# Patient Record
Sex: MALE | Race: WHITE | NOT HISPANIC OR LATINO | URBAN - METROPOLITAN AREA
[De-identification: names, ages, dates, MRNs, and addresses within clinical notes are randomized per-mention and may not be internally consistent; named-entity substitution may affect disease eponyms.]

---

## 2017-02-03 ENCOUNTER — EMERGENCY (EMERGENCY)
Facility: HOSPITAL | Age: 82
LOS: 1 days | Discharge: ANOTHER HOSPITAL | End: 2017-02-03
Attending: EMERGENCY MEDICINE | Admitting: EMERGENCY MEDICINE
Payer: MEDICARE

## 2017-02-03 ENCOUNTER — TRANSCRIPTION ENCOUNTER (OUTPATIENT)
Age: 82
End: 2017-02-03

## 2017-02-03 ENCOUNTER — INPATIENT (INPATIENT)
Facility: HOSPITAL | Age: 82
LOS: 4 days | Discharge: HOME | DRG: 552 | End: 2017-02-08
Attending: INTERNAL MEDICINE | Admitting: INTERNAL MEDICINE
Payer: MEDICARE

## 2017-02-03 VITALS
OXYGEN SATURATION: 99 % | DIASTOLIC BLOOD PRESSURE: 78 MMHG | TEMPERATURE: 99 F | RESPIRATION RATE: 16 BRPM | SYSTOLIC BLOOD PRESSURE: 147 MMHG | HEART RATE: 84 BPM

## 2017-02-03 VITALS
HEART RATE: 80 BPM | SYSTOLIC BLOOD PRESSURE: 148 MMHG | DIASTOLIC BLOOD PRESSURE: 78 MMHG | RESPIRATION RATE: 18 BRPM | OXYGEN SATURATION: 97 %

## 2017-02-03 VITALS
WEIGHT: 115.96 LBS | DIASTOLIC BLOOD PRESSURE: 71 MMHG | SYSTOLIC BLOOD PRESSURE: 174 MMHG | HEART RATE: 70 BPM | TEMPERATURE: 98 F | OXYGEN SATURATION: 97 % | HEIGHT: 66 IN | RESPIRATION RATE: 16 BRPM

## 2017-02-03 DIAGNOSIS — Z95.1 PRESENCE OF AORTOCORONARY BYPASS GRAFT: Chronic | ICD-10-CM

## 2017-02-03 DIAGNOSIS — Z87.01 PERSONAL HISTORY OF PNEUMONIA (RECURRENT): ICD-10-CM
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
## 2017-02-03 DIAGNOSIS — I25.10 ATHEROSCLEROTIC HEART DISEASE OF NATIVE CORONARY ARTERY WITHOUT ANGINA PECTORIS: ICD-10-CM

## 2017-02-03 DIAGNOSIS — I25.2 OLD MYOCARDIAL INFARCTION: ICD-10-CM
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
## 2017-02-03 DIAGNOSIS — S12.9XXA FRACTURE OF NECK, UNSPECIFIED, INITIAL ENCOUNTER: ICD-10-CM

## 2017-02-03 DIAGNOSIS — S12.100A UNSPECIFIED DISPLACED FRACTURE OF SECOND CERVICAL VERTEBRA, INITIAL ENCOUNTER FOR CLOSED FRACTURE: ICD-10-CM

## 2017-02-03 DIAGNOSIS — Z95.1 PRESENCE OF AORTOCORONARY BYPASS GRAFT: ICD-10-CM

## 2017-02-03 DIAGNOSIS — M54.2 CERVICALGIA: ICD-10-CM

## 2017-02-03 DIAGNOSIS — F03.90 UNSPECIFIED DEMENTIA, UNSPECIFIED SEVERITY, WITHOUT BEHAVIORAL DISTURBANCE, PSYCHOTIC DISTURBANCE, MOOD DISTURBANCE, AND ANXIETY: ICD-10-CM

## 2017-02-03 PROBLEM — Z00.00 ENCOUNTER FOR PREVENTIVE HEALTH EXAMINATION: Status: ACTIVE | Noted: 2017-02-03

## 2017-02-03 LAB
ALBUMIN SERPL ELPH-MCNC: 3.4 G/DL — SIGNIFICANT CHANGE UP (ref 3.3–5)
ALP SERPL-CCNC: 112 U/L — SIGNIFICANT CHANGE UP (ref 40–120)
ALT FLD-CCNC: 17 U/L RC — SIGNIFICANT CHANGE UP (ref 10–45)
ANION GAP SERPL CALC-SCNC: 13 MMOL/L — SIGNIFICANT CHANGE UP (ref 5–17)
APTT BLD: 48.1 SEC — HIGH (ref 27.5–37.4)
AST SERPL-CCNC: 21 U/L — SIGNIFICANT CHANGE UP (ref 10–40)
BASOPHILS # BLD AUTO: 0 K/UL — SIGNIFICANT CHANGE UP (ref 0–0.2)
BASOPHILS NFR BLD AUTO: 0.2 % — SIGNIFICANT CHANGE UP (ref 0–2)
BILIRUB SERPL-MCNC: 0.6 MG/DL — SIGNIFICANT CHANGE UP (ref 0.2–1.2)
BLD GP AB SCN SERPL QL: NEGATIVE — SIGNIFICANT CHANGE UP
BUN SERPL-MCNC: 24 MG/DL — HIGH (ref 7–23)
CALCIUM SERPL-MCNC: 8.7 MG/DL — SIGNIFICANT CHANGE UP (ref 8.4–10.5)
CHLORIDE SERPL-SCNC: 105 MMOL/L — SIGNIFICANT CHANGE UP (ref 96–108)
CO2 SERPL-SCNC: 25 MMOL/L — SIGNIFICANT CHANGE UP (ref 22–31)
CREAT SERPL-MCNC: 0.66 MG/DL — SIGNIFICANT CHANGE UP (ref 0.5–1.3)
EOSINOPHIL # BLD AUTO: 0.1 K/UL — SIGNIFICANT CHANGE UP (ref 0–0.5)
EOSINOPHIL NFR BLD AUTO: 1.1 % — SIGNIFICANT CHANGE UP (ref 0–6)
GLUCOSE SERPL-MCNC: 101 MG/DL — HIGH (ref 70–99)
HCT VFR BLD CALC: 34.3 % — LOW (ref 39–50)
HGB BLD-MCNC: 11.2 G/DL — LOW (ref 13–17)
INR BLD: 1.1 RATIO — SIGNIFICANT CHANGE UP (ref 0.88–1.16)
LYMPHOCYTES # BLD AUTO: 1.5 K/UL — SIGNIFICANT CHANGE UP (ref 1–3.3)
LYMPHOCYTES # BLD AUTO: 14.3 % — SIGNIFICANT CHANGE UP (ref 13–44)
MCHC RBC-ENTMCNC: 26.6 PG — LOW (ref 27–34)
MCHC RBC-ENTMCNC: 32.6 GM/DL — SIGNIFICANT CHANGE UP (ref 32–36)
MCV RBC AUTO: 81.8 FL — SIGNIFICANT CHANGE UP (ref 80–100)
MONOCYTES # BLD AUTO: 1 K/UL — HIGH (ref 0–0.9)
MONOCYTES NFR BLD AUTO: 9.4 % — SIGNIFICANT CHANGE UP (ref 2–14)
NEUTROPHILS # BLD AUTO: 8 K/UL — HIGH (ref 1.8–7.4)
NEUTROPHILS NFR BLD AUTO: 74.9 % — SIGNIFICANT CHANGE UP (ref 43–77)
PLATELET # BLD AUTO: 225 K/UL — SIGNIFICANT CHANGE UP (ref 150–400)
POTASSIUM SERPL-MCNC: 4.2 MMOL/L — SIGNIFICANT CHANGE UP (ref 3.5–5.3)
POTASSIUM SERPL-SCNC: 4.2 MMOL/L — SIGNIFICANT CHANGE UP (ref 3.5–5.3)
PROT SERPL-MCNC: 6.2 G/DL — SIGNIFICANT CHANGE UP (ref 6–8.3)
PROTHROM AB SERPL-ACNC: 11.9 SEC — SIGNIFICANT CHANGE UP (ref 10–13.1)
RBC # BLD: 4.2 M/UL — SIGNIFICANT CHANGE UP (ref 4.2–5.8)
RBC # FLD: 15 % — HIGH (ref 10.3–14.5)
RH IG SCN BLD-IMP: POSITIVE — SIGNIFICANT CHANGE UP
RH IG SCN BLD-IMP: POSITIVE — SIGNIFICANT CHANGE UP
SODIUM SERPL-SCNC: 143 MMOL/L — SIGNIFICANT CHANGE UP (ref 135–145)
WBC # BLD: 10.7 K/UL — HIGH (ref 3.8–10.5)
WBC # FLD AUTO: 10.7 K/UL — HIGH (ref 3.8–10.5)

## 2017-02-03 PROCEDURE — 99285 EMERGENCY DEPT VISIT HI MDM: CPT | Mod: 25,GC

## 2017-02-03 PROCEDURE — 99223 1ST HOSP IP/OBS HIGH 75: CPT

## 2017-02-03 PROCEDURE — 72125 CT NECK SPINE W/O DYE: CPT | Mod: 26

## 2017-02-03 PROCEDURE — 70450 CT HEAD/BRAIN W/O DYE: CPT | Mod: 26

## 2017-02-03 PROCEDURE — 93010 ELECTROCARDIOGRAM REPORT: CPT

## 2017-02-03 PROCEDURE — 99285 EMERGENCY DEPT VISIT HI MDM: CPT

## 2017-02-03 PROCEDURE — 70450 CT HEAD/BRAIN W/O DYE: CPT

## 2017-02-03 PROCEDURE — 72125 CT NECK SPINE W/O DYE: CPT

## 2017-02-03 PROCEDURE — 99285 EMERGENCY DEPT VISIT HI MDM: CPT | Mod: 25

## 2017-02-03 RX ORDER — TRAMADOL HYDROCHLORIDE 50 MG/1
50 TABLET ORAL ONCE
Qty: 0 | Refills: 0 | Status: DISCONTINUED | OUTPATIENT
Start: 2017-02-03 | End: 2017-02-03

## 2017-02-03 RX ORDER — SENNA PLUS 8.6 MG/1
2 TABLET ORAL AT BEDTIME
Qty: 0 | Refills: 0 | Status: DISCONTINUED | OUTPATIENT
Start: 2017-02-03 | End: 2017-02-08

## 2017-02-03 RX ORDER — HEPARIN SODIUM 5000 [USP'U]/ML
5000 INJECTION INTRAVENOUS; SUBCUTANEOUS EVERY 8 HOURS
Qty: 0 | Refills: 0 | Status: DISCONTINUED | OUTPATIENT
Start: 2017-02-03 | End: 2017-02-08

## 2017-02-03 NOTE — ED ADULT NURSE NOTE - OBJECTIVE STATEMENT
patient received via ambulance transferred from [facility] s/p fall from nursing home dx cervical fx. As per patient he was on a chair this am & accidentally fell off. Staff noted increasing lethargy & sent to clinic then sent to [place]. CT revealed a C2 fx. Received with a phila c collar changed to [place] collar on arrival. C spine precaution maintained. Alert & oriented to self,person & place but not to time & year. awaiting for spine consult.

## 2017-02-03 NOTE — ED PROVIDER NOTE - CARE PLAN
Principal Discharge DX:	Fall, initial encounter  Secondary Diagnosis:	Closed odontoid fracture with type II morphology and posterior displacement, initial encounter

## 2017-02-03 NOTE — H&P ADULT. - EKG AND INTERPRETATION
Personally reviewed: EKG 71 bpm  QTc 430 incomplete RBBB TW in V3 Personally reviewed: EKG 71 bpm  QTc 467 incomplete RBBB TWI inv in V3

## 2017-02-03 NOTE — ED PROVIDER NOTE - CONSTITUTIONAL, MLM
normal... Well appearing, well nourished, awake, alert, oriented to person, and in no apparent distress.

## 2017-02-03 NOTE — ED PROVIDER NOTE - OBJECTIVE STATEMENT
88M history of CAD (sp CABG), dementia (A&Ox2) presents from [facility] where he was evaluated after a mechanical fall from chair this morning found to have C2 type 2 fracture with neuro intact. C/o mild head and neck pain, but no other complaints of chest pain, abdominal pain, vision changes, paresthesias. 88M history of CAD (sp CABG), at base line MS gavino dementia (A&Ox2) presents from [facility] where he was evaluated after a mechanical fall from chair this morning found to have C2 type 2 fracture with neuro intact. C/o mild head and neck pain, but no other complaints of chest pain, abdominal pain, vision changes, paresthesias.

## 2017-02-03 NOTE — ED ADULT NURSE NOTE - OBJECTIVE STATEMENT
Pt alert and oriented x2, family member reports patient "dozed" while sitting in chair, fell and hit head on ground, X-rays show C-2 fx, currently denies pain unless he moves, refused pain medication, prepared for transfer to [facility] ED, accompanied by family member.

## 2017-02-03 NOTE — ED PROVIDER NOTE - PROGRESS NOTE DETAILS
KUMARI: Alert awake, No T/S/L spine TTP, + Mild c spine at C2 level ttp. Nonfoc KENYETTA: Nonfocal, admit, reasses

## 2017-02-03 NOTE — H&P ADULT. - HISTORY OF PRESENT ILLNESS
87 yo man with PMH of MI/CAD s/p CABG ~8 years ago, dementia (~ 1 year) presents after a mechanical fall complaining of neck pain. Patient was sitting in kitchen chair and fell sideways and onto the sewing machine table near by and then fell forward. And saw red edson on forehead and took a shower thereafter. He was complaining and hurting after a while and took him to urgent. No increase confusion and remained at baseline. Dose have home health aide 8 hrs a day for the week. Denies fevers, chills, sweats, CP, SOB, palpitations. Denies abdominal pain, n/v/d. Possible dysuria. Typically with good appetite.       Donepezil 10 mg qAM  MVI  Iron pill 89 yo man with PMH of MI/CAD s/p CABG ~8 years ago, dementia (~ 1 year) presents after a mechanical fall complaining of neck pain. Neck pain initially described as 7/10 (currently patient states improved) without radiation. No appreciable new numbness, nor weakness. This morning, patient was sitting in kitchen chair and fell sideways and onto the sewing machine table near by and then fell forward. Fall witness by Aide. And saw red edson on forehead and took a shower thereafter. He was complaining and hurting after a while and took him to urgent care center and thereafter to OSH ED.  No increase confusion and remained at baseline. Dose have home health aide 8 hrs a day for the week. Denies fevers, chills, sweats, CP, SOB, palpitations. Denies abdominal pain, n/v/d. Possible dysuria. Typically with good appetite.

## 2017-02-03 NOTE — ED PROVIDER NOTE - MEDICAL DECISION MAKING DETAILS
88M history of CAD (sp CABG), at base line MS w dementia (A&Ox2) presents from [facility] where he was evaluated after a mechanical fall from chair this morning found to have C2 type 2 fracture with neuro intact. C/o mild head and neck pain. C collar in place, spine consult, admit to medicine w spine consult reassess admit

## 2017-02-03 NOTE — ED PROVIDER NOTE - ATTENDING CONTRIBUTION TO CARE
88M history of CAD (sp CABG), at base line MS w dementia (A&Ox2) presents from [facility] where he was evaluated after a mechanical fall from chair this morning found to have C2 type 2 fracture with neuro intact. C/o mild head and neck pain. C collar in place, spine consult, admit to medicine w spine consult reassess admit

## 2017-02-03 NOTE — H&P ADULT. - ASSESSMENT
87 yo man with PMH of MI/CAD s/p CABG ~8 years ago, dementia (~ 1 year) presents after a mechanical fall complaining of neck pain with C2 dens fracture.

## 2017-02-03 NOTE — H&P ADULT. - PROBLEM SELECTOR PLAN 1
Reviewed neurosurgery recs:  Q4H neuro checks  Continue with C-Collar  Type II Dens fracture with posterior displacement   Spine precautions prior to MRI  MRI C-Spine without contrast  PT/OT after MRI

## 2017-02-03 NOTE — ED PROVIDER NOTE - MUSCULOSKELETAL, MLM
Spine appears normal, range of motion is not limited, no muscle or joint tenderness. C-collar in place.

## 2017-02-03 NOTE — H&P ADULT. - RADIOLOGY RESULTS AND INTERPRETATION
CT Head and C-Spine:   BRAIN: No intracranial hemorrhage, mass effect or depressed skull   fracture.  CERVICAL SPINE: Type II dens fracture with mild posterior displacement of   the dens.  No significant bony narrowing of the foramen magnum or   cervical spinal canal.

## 2017-02-03 NOTE — ED PROVIDER NOTE - PROGRESS NOTE DETAILS
transfer center called- for trauma and spine transfer center called- for trauma and spine accepting spine Latefi  er attending Felipe

## 2017-02-03 NOTE — ED ADULT NURSE REASSESSMENT NOTE - NS ED NURSE REASSESS COMMENT FT1
3470- transport relayed information received from MRI that test is scheduled tommorrow instead. [place] transported direct to room assigned.

## 2017-02-03 NOTE — ED PROVIDER NOTE - OBJECTIVE STATEMENT
Data per son-pt is poor historian: PMD dr audrey broussard co "neck pain upon awakening" but with further questioning, per son pt fell off a chair this am and thit the top of his head.  co pain all day so son brought him to er for eval  no weakness numbness but pain in neck and bump on head  pmhx of memory problems, cad sp mi sp cabgx3 pneumonia sp hernia repair sp carpal tunnel repair

## 2017-02-03 NOTE — H&P ADULT. - MOTOR
5/5 Strength of RUE  3/5 Strength of LUE at shoulder (chronic per patient) 4-5/5 Strength of LUE and elbow and hand   5/5 Strength of B/L LE

## 2017-02-04 DIAGNOSIS — F03.90 UNSPECIFIED DEMENTIA, UNSPECIFIED SEVERITY, WITHOUT BEHAVIORAL DISTURBANCE, PSYCHOTIC DISTURBANCE, MOOD DISTURBANCE, AND ANXIETY: ICD-10-CM

## 2017-02-04 DIAGNOSIS — S12.9XXA FRACTURE OF NECK, UNSPECIFIED, INITIAL ENCOUNTER: ICD-10-CM

## 2017-02-04 DIAGNOSIS — I25.10 ATHEROSCLEROTIC HEART DISEASE OF NATIVE CORONARY ARTERY WITHOUT ANGINA PECTORIS: ICD-10-CM

## 2017-02-04 DIAGNOSIS — Z29.9 ENCOUNTER FOR PROPHYLACTIC MEASURES, UNSPECIFIED: ICD-10-CM

## 2017-02-04 LAB
ANION GAP SERPL CALC-SCNC: 13 MMOL/L — SIGNIFICANT CHANGE UP (ref 5–17)
BASOPHILS # BLD AUTO: 0 K/UL — SIGNIFICANT CHANGE UP (ref 0–0.2)
BASOPHILS NFR BLD AUTO: 0.2 % — SIGNIFICANT CHANGE UP (ref 0–2)
BUN SERPL-MCNC: 21 MG/DL — SIGNIFICANT CHANGE UP (ref 7–23)
CALCIUM SERPL-MCNC: 8.8 MG/DL — SIGNIFICANT CHANGE UP (ref 8.4–10.5)
CHLORIDE SERPL-SCNC: 101 MMOL/L — SIGNIFICANT CHANGE UP (ref 96–108)
CK MB BLD-MCNC: 2.6 % — SIGNIFICANT CHANGE UP (ref 0–3.5)
CK MB CFR SERPL CALC: 6.1 NG/ML — SIGNIFICANT CHANGE UP (ref 0–6.7)
CK SERPL-CCNC: 235 U/L — HIGH (ref 30–200)
CO2 SERPL-SCNC: 25 MMOL/L — SIGNIFICANT CHANGE UP (ref 22–31)
CREAT SERPL-MCNC: 0.63 MG/DL — SIGNIFICANT CHANGE UP (ref 0.5–1.3)
EOSINOPHIL # BLD AUTO: 0.2 K/UL — SIGNIFICANT CHANGE UP (ref 0–0.5)
EOSINOPHIL NFR BLD AUTO: 1.9 % — SIGNIFICANT CHANGE UP (ref 0–6)
GLUCOSE SERPL-MCNC: 91 MG/DL — SIGNIFICANT CHANGE UP (ref 70–99)
HCT VFR BLD CALC: 38.9 % — LOW (ref 39–50)
HGB BLD-MCNC: 12.2 G/DL — LOW (ref 13–17)
LYMPHOCYTES # BLD AUTO: 1.6 K/UL — SIGNIFICANT CHANGE UP (ref 1–3.3)
LYMPHOCYTES # BLD AUTO: 15.7 % — SIGNIFICANT CHANGE UP (ref 13–44)
MAGNESIUM SERPL-MCNC: 2.4 MG/DL — SIGNIFICANT CHANGE UP (ref 1.6–2.6)
MCHC RBC-ENTMCNC: 25.8 PG — LOW (ref 27–34)
MCHC RBC-ENTMCNC: 31.4 GM/DL — LOW (ref 32–36)
MCV RBC AUTO: 81.9 FL — SIGNIFICANT CHANGE UP (ref 80–100)
MONOCYTES # BLD AUTO: 1.1 K/UL — HIGH (ref 0–0.9)
MONOCYTES NFR BLD AUTO: 10.6 % — SIGNIFICANT CHANGE UP (ref 2–14)
NEUTROPHILS # BLD AUTO: 7.5 K/UL — HIGH (ref 1.8–7.4)
NEUTROPHILS NFR BLD AUTO: 71.6 % — SIGNIFICANT CHANGE UP (ref 43–77)
PHOSPHATE SERPL-MCNC: 3.1 MG/DL — SIGNIFICANT CHANGE UP (ref 2.5–4.5)
PLATELET # BLD AUTO: 239 K/UL — SIGNIFICANT CHANGE UP (ref 150–400)
POTASSIUM SERPL-MCNC: 4.1 MMOL/L — SIGNIFICANT CHANGE UP (ref 3.5–5.3)
POTASSIUM SERPL-SCNC: 4.1 MMOL/L — SIGNIFICANT CHANGE UP (ref 3.5–5.3)
RBC # BLD: 4.75 M/UL — SIGNIFICANT CHANGE UP (ref 4.2–5.8)
RBC # FLD: 15.1 % — HIGH (ref 10.3–14.5)
SODIUM SERPL-SCNC: 139 MMOL/L — SIGNIFICANT CHANGE UP (ref 135–145)
TROPONIN T SERPL-MCNC: <0.01 NG/ML — SIGNIFICANT CHANGE UP (ref 0–0.06)
WBC # BLD: 10.4 K/UL — SIGNIFICANT CHANGE UP (ref 3.8–10.5)
WBC # FLD AUTO: 10.4 K/UL — SIGNIFICANT CHANGE UP (ref 3.8–10.5)

## 2017-02-04 PROCEDURE — 72141 MRI NECK SPINE W/O DYE: CPT | Mod: 26

## 2017-02-04 PROCEDURE — 99233 SBSQ HOSP IP/OBS HIGH 50: CPT | Mod: GC

## 2017-02-04 RX ORDER — SODIUM CHLORIDE 9 MG/ML
500 INJECTION INTRAMUSCULAR; INTRAVENOUS; SUBCUTANEOUS
Qty: 0 | Refills: 0 | Status: DISCONTINUED | OUTPATIENT
Start: 2017-02-04 | End: 2017-02-04

## 2017-02-04 RX ORDER — INFLUENZA VIRUS VACCINE 15; 15; 15; 15 UG/.5ML; UG/.5ML; UG/.5ML; UG/.5ML
0.5 SUSPENSION INTRAMUSCULAR ONCE
Qty: 0 | Refills: 0 | Status: DISCONTINUED | OUTPATIENT
Start: 2017-02-04 | End: 2017-02-08

## 2017-02-04 RX ORDER — DONEPEZIL HYDROCHLORIDE 10 MG/1
10 TABLET, FILM COATED ORAL DAILY
Qty: 0 | Refills: 0 | Status: DISCONTINUED | OUTPATIENT
Start: 2017-02-04 | End: 2017-02-08

## 2017-02-04 RX ORDER — DONEPEZIL HYDROCHLORIDE 10 MG/1
0 TABLET, FILM COATED ORAL
Qty: 0 | Refills: 0 | COMMUNITY

## 2017-02-04 RX ADMIN — HEPARIN SODIUM 5000 UNIT(S): 5000 INJECTION INTRAVENOUS; SUBCUTANEOUS at 05:46

## 2017-02-04 RX ADMIN — HEPARIN SODIUM 5000 UNIT(S): 5000 INJECTION INTRAVENOUS; SUBCUTANEOUS at 13:07

## 2017-02-04 RX ADMIN — SODIUM CHLORIDE 50 MILLILITER(S): 9 INJECTION INTRAMUSCULAR; INTRAVENOUS; SUBCUTANEOUS at 06:50

## 2017-02-04 RX ADMIN — HEPARIN SODIUM 5000 UNIT(S): 5000 INJECTION INTRAVENOUS; SUBCUTANEOUS at 21:49

## 2017-02-04 RX ADMIN — DONEPEZIL HYDROCHLORIDE 10 MILLIGRAM(S): 10 TABLET, FILM COATED ORAL at 11:55

## 2017-02-05 LAB
ANION GAP SERPL CALC-SCNC: 13 MMOL/L — SIGNIFICANT CHANGE UP (ref 5–17)
BUN SERPL-MCNC: 17 MG/DL — SIGNIFICANT CHANGE UP (ref 7–23)
CALCIUM SERPL-MCNC: 8.9 MG/DL — SIGNIFICANT CHANGE UP (ref 8.4–10.5)
CHLORIDE SERPL-SCNC: 100 MMOL/L — SIGNIFICANT CHANGE UP (ref 96–108)
CK MB BLD-MCNC: 2.3 % — SIGNIFICANT CHANGE UP (ref 0–3.5)
CK MB BLD-MCNC: 2.5 % — SIGNIFICANT CHANGE UP (ref 0–3.5)
CK MB BLD-MCNC: 2.9 % — SIGNIFICANT CHANGE UP (ref 0–3.5)
CK MB BLD-MCNC: 2.9 % — SIGNIFICANT CHANGE UP (ref 0–3.5)
CK MB CFR SERPL CALC: 4 NG/ML — SIGNIFICANT CHANGE UP (ref 0–6.7)
CK MB CFR SERPL CALC: 6 NG/ML — SIGNIFICANT CHANGE UP (ref 0–6.7)
CK MB CFR SERPL CALC: 6.9 NG/ML — HIGH (ref 0–6.7)
CK MB CFR SERPL CALC: 7.2 NG/ML — HIGH (ref 0–6.7)
CK SERPL-CCNC: 137 U/L — SIGNIFICANT CHANGE UP (ref 30–200)
CK SERPL-CCNC: 208 U/L — HIGH (ref 30–200)
CK SERPL-CCNC: 290 U/L — HIGH (ref 30–200)
CK SERPL-CCNC: 302 U/L — HIGH (ref 30–200)
CO2 SERPL-SCNC: 26 MMOL/L — SIGNIFICANT CHANGE UP (ref 22–31)
CREAT SERPL-MCNC: 0.73 MG/DL — SIGNIFICANT CHANGE UP (ref 0.5–1.3)
GLUCOSE SERPL-MCNC: 80 MG/DL — SIGNIFICANT CHANGE UP (ref 70–99)
HCT VFR BLD CALC: 38.6 % — LOW (ref 39–50)
HGB BLD-MCNC: 12.2 G/DL — LOW (ref 13–17)
MAGNESIUM SERPL-MCNC: 2.4 MG/DL — SIGNIFICANT CHANGE UP (ref 1.6–2.6)
MCHC RBC-ENTMCNC: 25.7 PG — LOW (ref 27–34)
MCHC RBC-ENTMCNC: 31.6 GM/DL — LOW (ref 32–36)
MCV RBC AUTO: 81.2 FL — SIGNIFICANT CHANGE UP (ref 80–100)
PHOSPHATE SERPL-MCNC: 3.5 MG/DL — SIGNIFICANT CHANGE UP (ref 2.5–4.5)
PLATELET # BLD AUTO: 250 K/UL — SIGNIFICANT CHANGE UP (ref 150–400)
POTASSIUM SERPL-MCNC: 4 MMOL/L — SIGNIFICANT CHANGE UP (ref 3.5–5.3)
POTASSIUM SERPL-SCNC: 4 MMOL/L — SIGNIFICANT CHANGE UP (ref 3.5–5.3)
RBC # BLD: 4.75 M/UL — SIGNIFICANT CHANGE UP (ref 4.2–5.8)
RBC # FLD: 14.4 % — SIGNIFICANT CHANGE UP (ref 10.3–14.5)
SODIUM SERPL-SCNC: 139 MMOL/L — SIGNIFICANT CHANGE UP (ref 135–145)
TROPONIN T SERPL-MCNC: 0.01 NG/ML — SIGNIFICANT CHANGE UP (ref 0–0.06)
TROPONIN T SERPL-MCNC: <0.01 NG/ML — SIGNIFICANT CHANGE UP (ref 0–0.06)
WBC # BLD: 10.4 K/UL — SIGNIFICANT CHANGE UP (ref 3.8–10.5)
WBC # FLD AUTO: 10.4 K/UL — SIGNIFICANT CHANGE UP (ref 3.8–10.5)

## 2017-02-05 PROCEDURE — 93010 ELECTROCARDIOGRAM REPORT: CPT

## 2017-02-05 PROCEDURE — 99233 SBSQ HOSP IP/OBS HIGH 50: CPT | Mod: GC

## 2017-02-05 RX ORDER — ATORVASTATIN CALCIUM 80 MG/1
40 TABLET, FILM COATED ORAL AT BEDTIME
Qty: 0 | Refills: 0 | Status: DISCONTINUED | OUTPATIENT
Start: 2017-02-05 | End: 2017-02-08

## 2017-02-05 RX ORDER — ASPIRIN/CALCIUM CARB/MAGNESIUM 324 MG
81 TABLET ORAL DAILY
Qty: 0 | Refills: 0 | Status: DISCONTINUED | OUTPATIENT
Start: 2017-02-05 | End: 2017-02-08

## 2017-02-05 RX ADMIN — Medication 81 MILLIGRAM(S): at 12:19

## 2017-02-05 RX ADMIN — HEPARIN SODIUM 5000 UNIT(S): 5000 INJECTION INTRAVENOUS; SUBCUTANEOUS at 05:00

## 2017-02-05 RX ADMIN — HEPARIN SODIUM 5000 UNIT(S): 5000 INJECTION INTRAVENOUS; SUBCUTANEOUS at 14:36

## 2017-02-05 RX ADMIN — HEPARIN SODIUM 5000 UNIT(S): 5000 INJECTION INTRAVENOUS; SUBCUTANEOUS at 21:34

## 2017-02-05 RX ADMIN — ATORVASTATIN CALCIUM 40 MILLIGRAM(S): 80 TABLET, FILM COATED ORAL at 21:34

## 2017-02-05 RX ADMIN — DONEPEZIL HYDROCHLORIDE 10 MILLIGRAM(S): 10 TABLET, FILM COATED ORAL at 12:19

## 2017-02-06 LAB
ANION GAP SERPL CALC-SCNC: 12 MMOL/L — SIGNIFICANT CHANGE UP (ref 5–17)
BUN SERPL-MCNC: 28 MG/DL — HIGH (ref 7–23)
CALCIUM SERPL-MCNC: 8.9 MG/DL — SIGNIFICANT CHANGE UP (ref 8.4–10.5)
CHLORIDE SERPL-SCNC: 100 MMOL/L — SIGNIFICANT CHANGE UP (ref 96–108)
CK MB BLD-MCNC: 3.5 % — SIGNIFICANT CHANGE UP (ref 0–3.5)
CK MB CFR SERPL CALC: 3.6 NG/ML — SIGNIFICANT CHANGE UP (ref 0–6.7)
CK SERPL-CCNC: 102 U/L — SIGNIFICANT CHANGE UP (ref 30–200)
CO2 SERPL-SCNC: 27 MMOL/L — SIGNIFICANT CHANGE UP (ref 22–31)
CREAT SERPL-MCNC: 0.8 MG/DL — SIGNIFICANT CHANGE UP (ref 0.5–1.3)
GLUCOSE SERPL-MCNC: 83 MG/DL — SIGNIFICANT CHANGE UP (ref 70–99)
HCT VFR BLD CALC: 39.7 % — SIGNIFICANT CHANGE UP (ref 39–50)
HGB BLD-MCNC: 12.5 G/DL — LOW (ref 13–17)
MAGNESIUM SERPL-MCNC: 2.4 MG/DL — SIGNIFICANT CHANGE UP (ref 1.6–2.6)
MCHC RBC-ENTMCNC: 25.6 PG — LOW (ref 27–34)
MCHC RBC-ENTMCNC: 31.6 GM/DL — LOW (ref 32–36)
MCV RBC AUTO: 81.2 FL — SIGNIFICANT CHANGE UP (ref 80–100)
PHOSPHATE SERPL-MCNC: 3.3 MG/DL — SIGNIFICANT CHANGE UP (ref 2.5–4.5)
PLATELET # BLD AUTO: 247 K/UL — SIGNIFICANT CHANGE UP (ref 150–400)
POTASSIUM SERPL-MCNC: 3.8 MMOL/L — SIGNIFICANT CHANGE UP (ref 3.5–5.3)
POTASSIUM SERPL-SCNC: 3.8 MMOL/L — SIGNIFICANT CHANGE UP (ref 3.5–5.3)
RBC # BLD: 4.89 M/UL — SIGNIFICANT CHANGE UP (ref 4.2–5.8)
RBC # FLD: 15 % — HIGH (ref 10.3–14.5)
SODIUM SERPL-SCNC: 139 MMOL/L — SIGNIFICANT CHANGE UP (ref 135–145)
TROPONIN T SERPL-MCNC: <0.01 NG/ML — SIGNIFICANT CHANGE UP (ref 0–0.06)
WBC # BLD: 10 K/UL — SIGNIFICANT CHANGE UP (ref 3.8–10.5)
WBC # FLD AUTO: 10 K/UL — SIGNIFICANT CHANGE UP (ref 3.8–10.5)

## 2017-02-06 PROCEDURE — 99232 SBSQ HOSP IP/OBS MODERATE 35: CPT | Mod: GC

## 2017-02-06 PROCEDURE — 99223 1ST HOSP IP/OBS HIGH 75: CPT | Mod: GC

## 2017-02-06 RX ORDER — ACETAMINOPHEN 500 MG
650 TABLET ORAL EVERY 6 HOURS
Qty: 0 | Refills: 0 | Status: DISCONTINUED | OUTPATIENT
Start: 2017-02-06 | End: 2017-02-08

## 2017-02-06 RX ORDER — ACETAMINOPHEN 500 MG
650 TABLET ORAL
Qty: 0 | Refills: 0 | Status: DISCONTINUED | OUTPATIENT
Start: 2017-02-06 | End: 2017-02-06

## 2017-02-06 RX ADMIN — HEPARIN SODIUM 5000 UNIT(S): 5000 INJECTION INTRAVENOUS; SUBCUTANEOUS at 22:30

## 2017-02-06 RX ADMIN — HEPARIN SODIUM 5000 UNIT(S): 5000 INJECTION INTRAVENOUS; SUBCUTANEOUS at 14:44

## 2017-02-06 RX ADMIN — ATORVASTATIN CALCIUM 40 MILLIGRAM(S): 80 TABLET, FILM COATED ORAL at 22:30

## 2017-02-06 RX ADMIN — HEPARIN SODIUM 5000 UNIT(S): 5000 INJECTION INTRAVENOUS; SUBCUTANEOUS at 05:17

## 2017-02-06 RX ADMIN — DONEPEZIL HYDROCHLORIDE 10 MILLIGRAM(S): 10 TABLET, FILM COATED ORAL at 12:33

## 2017-02-06 RX ADMIN — Medication 81 MILLIGRAM(S): at 12:33

## 2017-02-07 ENCOUNTER — TRANSCRIPTION ENCOUNTER (OUTPATIENT)
Age: 82
End: 2017-02-07

## 2017-02-07 PROCEDURE — 99232 SBSQ HOSP IP/OBS MODERATE 35: CPT | Mod: GC

## 2017-02-07 PROCEDURE — 93306 TTE W/DOPPLER COMPLETE: CPT | Mod: 26

## 2017-02-07 RX ADMIN — HEPARIN SODIUM 5000 UNIT(S): 5000 INJECTION INTRAVENOUS; SUBCUTANEOUS at 05:52

## 2017-02-07 RX ADMIN — ATORVASTATIN CALCIUM 40 MILLIGRAM(S): 80 TABLET, FILM COATED ORAL at 22:19

## 2017-02-07 RX ADMIN — HEPARIN SODIUM 5000 UNIT(S): 5000 INJECTION INTRAVENOUS; SUBCUTANEOUS at 13:14

## 2017-02-07 RX ADMIN — Medication 81 MILLIGRAM(S): at 13:14

## 2017-02-07 RX ADMIN — HEPARIN SODIUM 5000 UNIT(S): 5000 INJECTION INTRAVENOUS; SUBCUTANEOUS at 22:18

## 2017-02-07 RX ADMIN — DONEPEZIL HYDROCHLORIDE 10 MILLIGRAM(S): 10 TABLET, FILM COATED ORAL at 13:15

## 2017-02-07 NOTE — PHYSICAL THERAPY INITIAL EVALUATION ADULT - IMPAIRED TRANSFERS: SIT/STAND, REHAB EVAL
decreased endurance , sit-stand mod unsteady tendency to retropulse at first then balance fair - with min assist of1/cognition/impaired postural control/decreased strength/impaired balance

## 2017-02-07 NOTE — PHYSICAL THERAPY INITIAL EVALUATION ADULT - PERTINENT HX OF CURRENT PROBLEM, REHAB EVAL
89 yo man with PMH of MI/CAD s/p CABG ~8 years ago, dementia (~ 1 year) presents after a mechanical fall complaining of neck pain. Neck pain initially described as 7/10 (currently patient states improved) without radiation. No appreciable new numbness, nor weakness. This morning, patient was sitting in kitchen chair and fell sideways and onto the sewing machine table near by and then fell forward. Fall witness by Aide & saw red edson on forehead and took a shower;still hurt so took to Urgent Care

## 2017-02-07 NOTE — PHYSICAL THERAPY INITIAL EVALUATION ADULT - PHYSICAL ASSIST/NONPHYSICAL ASSIST, REHAB EVAL
verbal cues/1 person assist/supervision/pt educated about no pushng or pulling on siderails due to neck fx

## 2017-02-07 NOTE — PHYSICAL THERAPY INITIAL EVALUATION ADULT - ADDITIONAL COMMENTS
pt lives alone in house has private HHA 8 hrs x 5 days a week ,assist with ADL's   has son Spencer pt lives alone in house has private HHA 8 hrs x 5 days a week ,assist with ADL's   has son Spencer [phone] that pt identified as caregiver (PT called and left message with pt permission to discuss discharge planning ,await call back from pt son ) pt lives alone in house has private HHA 8 hrs x 5 days a week ,assist with ADL's   has son Spencer [phone] that pt identified as caregiver (PT called and left message with pt permission to discuss discharge planning ,await call back from pt son ); per  note wife is in a NH (when asked who pt lives with he stated he lives with his parents )

## 2017-02-07 NOTE — DISCHARGE NOTE ADULT - CARE PLAN
Principal Discharge DX:	Dens fracture  Secondary Diagnosis:	CAD (coronary artery disease)  Instructions for follow-up, activity and diet:	Please continue taking medications as prescribed and follow up with your cardiologist within 1 week of discharge. If you notice any chest discomfort, shortness of breath or malaise, please seek medical care immediately. Principal Discharge DX:	Dens fracture  Goal:	Prevention of adverse effects and preservation of function  Instructions for follow-up, activity and diet:	You were found with a dens fracture that was evaluated with MRI. MRI showed posterir movement of the dens and Neurosurgery recommended wearing a neck collar for the rest of your life 24/7, so you can decrease your chance of having another stroke. Please follow up with your neurosurgeon, cardiologist within 1 week of discharge. If you notice any worsening of neck pain, any weakness or any numbness in your body, please seek medical care immediately.  Secondary Diagnosis:	CAD (coronary artery disease)  Instructions for follow-up, activity and diet:	Please continue taking medications as prescribed and follow up with your cardiologist within 1 week of discharge. If you notice any chest discomfort, shortness of breath or malaise, please seek medical care immediately.

## 2017-02-07 NOTE — DISCHARGE NOTE ADULT - PATIENT PORTAL LINK FT
“You can access the Follow[facility] Patient Portal, offered by [facility], by registering with the following website: [email or web address]”

## 2017-02-07 NOTE — PHYSICAL THERAPY INITIAL EVALUATION ADULT - REFERRING PHYSICIAN, REHAB EVAL
jean marie Clark MD ORDER PT EVAL and TREAT ,cleared by neurosurgery will have to wear cervical collar for life , AMB with Assist

## 2017-02-07 NOTE — PHYSICAL THERAPY INITIAL EVALUATION ADULT - ACTIVE RANGE OF MOTION EXAMINATION, REHAB EVAL
bilateral  lower extremity Active ROM was WFL (within functional limits)/bilateral UE's WFL's following precatuions no shoulder ROm above 90 degrees

## 2017-02-07 NOTE — PHYSICAL THERAPY INITIAL EVALUATION ADULT - PRECAUTIONS/LIMITATIONS, REHAB EVAL
spinal precautions/fall precautions/+ Type II Dens Fx s/p fall on MRI Cervical Spine and CT Cervical Spine with slight posterior displacement of Dens ,no obvious spinal cord  compression and well defined CSF plan between dens and Cervical spinal cord ,study severley limiteed due to pt motion ; multilevel vertebral body height loss C2-C3 down to C7-T1 and DJD ; HEAD CT w/o acute event ; EKG  2/5/17 RBBB/LAFB/Bifascicular  Block

## 2017-02-07 NOTE — DISCHARGE NOTE ADULT - MEDICATION SUMMARY - MEDICATIONS TO TAKE
I will START or STAY ON the medications listed below when I get home from the hospital:    aspirin 81 mg oral delayed release tablet  -- 1 tab(s) by mouth once a day  -- Indication: For CAD (coronary artery disease)    atorvastatin 40 mg oral tablet  -- 1 tab(s) by mouth once a day (at bedtime)  -- Indication: For CAD (coronary artery disease)    donepezil 10 mg oral tablet  --  by mouth once a day  -- Indication: For Dementia    senna oral tablet  -- 2 tab(s) by mouth once a day (at bedtime), As needed, Constipation  -- Indication: For Constipation     multivitamin  --   once a day  -- Indication: For nutrition I will START or STAY ON the medications listed below when I get home from the hospital:    aspirin 81 mg oral delayed release tablet  -- 1 tab(s) by mouth once a day  -- Indication: For CAD (coronary artery disease)    atorvastatin 40 mg oral tablet  -- 1 tab(s) by mouth once a day (at bedtime)  -- Indication: For CAD (coronary artery disease)    donepezil 10 mg oral tablet  --  by mouth once a day  -- Indication: For Dementia    senna oral tablet  -- 2 tab(s) by mouth once a day (at bedtime), As needed, Constipation  -- Indication: For Constipation     multivitamin  -- 1 tab(s) by mouth once a day  -- Indication: For nutrition/metabolism

## 2017-02-07 NOTE — DISCHARGE NOTE ADULT - PLAN OF CARE
Please continue taking medications as prescribed and follow up with your cardiologist within 1 week of discharge. If you notice any chest discomfort, shortness of breath or malaise, please seek medical care immediately. Prevention of adverse effects and preservation of function You were found with a dens fracture that was evaluated with MRI. MRI showed posterir movement of the dens and Neurosurgery recommended wearing a neck collar for the rest of your life 24/7, so you can decrease your chance of having another stroke. Please follow up with your neurosurgeon, cardiologist within 1 week of discharge. If you notice any worsening of neck pain, any weakness or any numbness in your body, please seek medical care immediately.

## 2017-02-07 NOTE — PHYSICAL THERAPY INITIAL EVALUATION ADULT - GENERAL OBSERVATIONS, REHAB EVAL
pt received in bed nad + cervical collar ; pt agreeable for PT ; pt pleasant and cooperative , + confused ,

## 2017-02-07 NOTE — PHYSICAL THERAPY INITIAL EVALUATION ADULT - TRANSFER SAFETY CONCERNS NOTED: SIT/STAND, REHAB EVAL
decreased step length/decreased weight-shifting ability/losing balance/decreased balance during turns

## 2017-02-07 NOTE — DISCHARGE NOTE ADULT - CARE PROVIDERS DIRECT ADDRESSES
,[email or web address],DirectAddress_Unknown,DirectAddress_Unknown

## 2017-02-07 NOTE — DISCHARGE NOTE ADULT - HOSPITAL COURSE
87 yo man with PMH of MI/CAD s/p CABG ~8 years ago, dementia (~ 1 year) presents after a mechanical fall complaining of neck pain. Neck pain initially described as 7/10 (currently patient states improved) without radiation. No appreciable new numbness, nor weakness. This morning, patient was sitting in kitchen chair and fell sideways and onto the sewing machine table near by and then fell forward. Fall witness by Aide. And saw red edson on forehead and took a shower thereafter. He was complaining and hurting after a while and took him to urgent care center and thereafter to OSH ED.  No increase confusion and remained at baseline. Dose have home health aide 8 hrs a day for the week. Denies fevers, chills, sweats, CP, SOB, palpitations. Denies abdominal pain, n/v/d. Possible dysuria. Typically with good appetite.     During hospitalization, patient had MRI and neurosurgery followed patient during hospitalization. MRI revealed slight posterior displacement of the dens without any cord compression. Neurosurgery recommended wearing a cervical collar until cleared by neurosurgery. Patient also had cardiac enzymes trended and EKG showed dynamic changes. 89 yo man with PMH of MI/CAD s/p CABG ~8 years ago, dementia (~ 1 year) presents after a mechanical fall complaining of neck pain. Neck pain initially described as 7/10 (currently patient states improved) without radiation. No appreciable new numbness, nor weakness. This morning, patient was sitting in kitchen chair and fell sideways and onto the sewing machine table near by and then fell forward. Fall witness by Aide. And saw red edson on forehead and took a shower thereafter. He was complaining and hurting after a while and took him to urgent care center and thereafter to OSH ED.  No increase confusion and remained at baseline. Dose have home health aide 8 hrs a day for the week. Denies fevers, chills, sweats, CP, SOB, palpitations. Denies abdominal pain, n/v/d. Possible dysuria. Typically with good appetite.     During hospitalization, patient had MRI and neurosurgery followed patient during hospitalization. MRI revealed slight posterior displacement of the dens without any cord compression. Neurosurgery recommended wearing a cervical collar until cleared by neurosurgery. The patient was evaluated BOTH by neurosurgery and by orthopedics and both recommended Cervical collar for life. Patient also had cardiac enzymes trended and EKG showed dynamic changes.       Then the patient was seen by physical therapy who recommended rehab. Also recommended no sudden movements, keeping C -collar on, not raising arms past 90 degrees, not bending over , not turning head - patient showed understanding on teachback.

## 2017-02-07 NOTE — PHYSICAL THERAPY INITIAL EVALUATION ADULT - DISCHARGE DISPOSITION, PT EVAL
rehabilitation facility/{T recommend subacute rehab to increase functional mobility , strength, balance, endurance, fall prevention education continued ; left message for pt son  Spencer await return call re discharge planning recommendation  with PT DEPT number left in message ;spoke with [facility] re plan as well

## 2017-02-07 NOTE — DISCHARGE NOTE ADULT - ADDITIONAL INSTRUCTIONS
please follow up with your doctor at rehab   DO NOT BEND, DO NOTTWIST, DO NOT REMOVE YOUR NECK COLLAR , DO NOT Cough or make any sudden movements

## 2017-02-07 NOTE — PHYSICAL THERAPY INITIAL EVALUATION ADULT - IMPAIRMENTS CONTRIBUTING IMPAIRED BED MOBILITY, REHAB EVAL
impaired postural control/sat x 5 min with cg of1 fair balance in sitting/cognition/impaired balance/decreased strength

## 2017-02-07 NOTE — PHYSICAL THERAPY INITIAL EVALUATION ADULT - ASR EQUIP NEEDS DISCH PT EVAL
pt has rolling walker and std cane at home per pt;pt has HHA private x 8 hrs x 5 days a week to assist with adl's and meals per pt and around the house

## 2017-02-07 NOTE — DISCHARGE NOTE ADULT - CARE PROVIDER_API CALL
Cameron Martin (DO), Neurological Surgery  [address] [place] [address]  Phone: [phone]  Fax: [phone]    Fermin Wayne  [address] [place] [address]  Phone: [phone]  Phone: (   )    -  Fax: (   )    -

## 2017-02-07 NOTE — DISCHARGE NOTE ADULT - PROVIDER TOKENS
TOKEN:'[id number]',FREE:[LAST:[Tone],FIRST:[Fermin],PHONE:[(   )    -],FAX:[(   )    -],ADDRESS:[[address] [place] [address]  Phone: [phone]]]

## 2017-02-07 NOTE — PHYSICAL THERAPY INITIAL EVALUATION ADULT - IMPAIRMENTS CONTRIBUTING TO GAIT DEVIATIONS, PT EVAL
impaired postural control/decreased endurance , intermittent min to mod unsteady vc to stand upright and widen base of support/narrow base of support/cognition/decreased strength/impaired balance

## 2017-02-07 NOTE — PHYSICAL THERAPY INITIAL EVALUATION ADULT - GAIT DEVIATIONS NOTED, PT EVAL
increased time in double stance/decreased noel/decreased weight-shifting ability/decreased stride length/decreased step length

## 2017-02-08 VITALS
TEMPERATURE: 98 F | SYSTOLIC BLOOD PRESSURE: 119 MMHG | HEART RATE: 67 BPM | DIASTOLIC BLOOD PRESSURE: 66 MMHG | OXYGEN SATURATION: 95 % | RESPIRATION RATE: 18 BRPM

## 2017-02-08 PROCEDURE — 72141 MRI NECK SPINE W/O DYE: CPT

## 2017-02-08 PROCEDURE — 99239 HOSP IP/OBS DSCHRG MGMT >30: CPT

## 2017-02-08 PROCEDURE — 86901 BLOOD TYPING SEROLOGIC RH(D): CPT

## 2017-02-08 PROCEDURE — 80053 COMPREHEN METABOLIC PANEL: CPT

## 2017-02-08 PROCEDURE — 93005 ELECTROCARDIOGRAM TRACING: CPT

## 2017-02-08 PROCEDURE — 84100 ASSAY OF PHOSPHORUS: CPT

## 2017-02-08 PROCEDURE — 84484 ASSAY OF TROPONIN QUANT: CPT

## 2017-02-08 PROCEDURE — 86900 BLOOD TYPING SEROLOGIC ABO: CPT

## 2017-02-08 PROCEDURE — 99285 EMERGENCY DEPT VISIT HI MDM: CPT | Mod: 25

## 2017-02-08 PROCEDURE — 85027 COMPLETE CBC AUTOMATED: CPT

## 2017-02-08 PROCEDURE — C8929: CPT

## 2017-02-08 PROCEDURE — 97162 PT EVAL MOD COMPLEX 30 MIN: CPT

## 2017-02-08 PROCEDURE — 85730 THROMBOPLASTIN TIME PARTIAL: CPT

## 2017-02-08 PROCEDURE — 80048 BASIC METABOLIC PNL TOTAL CA: CPT

## 2017-02-08 PROCEDURE — 83735 ASSAY OF MAGNESIUM: CPT

## 2017-02-08 PROCEDURE — 85610 PROTHROMBIN TIME: CPT

## 2017-02-08 PROCEDURE — 86850 RBC ANTIBODY SCREEN: CPT

## 2017-02-08 PROCEDURE — 82553 CREATINE MB FRACTION: CPT

## 2017-02-08 PROCEDURE — 82550 ASSAY OF CK (CPK): CPT

## 2017-02-08 RX ORDER — SENNA PLUS 8.6 MG/1
2 TABLET ORAL
Qty: 60 | Refills: 3 | OUTPATIENT
Start: 2017-02-08 | End: 2017-06-07

## 2017-02-08 RX ORDER — ATORVASTATIN CALCIUM 80 MG/1
1 TABLET, FILM COATED ORAL
Qty: 30 | Refills: 3 | OUTPATIENT
Start: 2017-02-08

## 2017-02-08 RX ORDER — ASPIRIN/CALCIUM CARB/MAGNESIUM 324 MG
1 TABLET ORAL
Qty: 30 | Refills: 2 | OUTPATIENT
Start: 2017-02-08 | End: 2017-05-08

## 2017-02-08 RX ADMIN — HEPARIN SODIUM 5000 UNIT(S): 5000 INJECTION INTRAVENOUS; SUBCUTANEOUS at 14:13

## 2017-02-08 RX ADMIN — DONEPEZIL HYDROCHLORIDE 10 MILLIGRAM(S): 10 TABLET, FILM COATED ORAL at 12:17

## 2017-02-08 RX ADMIN — HEPARIN SODIUM 5000 UNIT(S): 5000 INJECTION INTRAVENOUS; SUBCUTANEOUS at 05:11

## 2017-02-08 RX ADMIN — Medication 81 MILLIGRAM(S): at 12:17

## 2017-03-21 ENCOUNTER — APPOINTMENT (OUTPATIENT)
Dept: SPINE | Facility: CLINIC | Age: 82
End: 2017-03-21

## 2017-03-21 VITALS
SYSTOLIC BLOOD PRESSURE: 129 MMHG | DIASTOLIC BLOOD PRESSURE: 72 MMHG | WEIGHT: 118 LBS | HEART RATE: 81 BPM | HEIGHT: 66 IN | BODY MASS INDEX: 18.96 KG/M2

## 2017-03-21 DIAGNOSIS — S12.100A UNSPECIFIED DISPLACED FRACTURE OF SECOND CERVICAL VERTEBRA, INITIAL ENCOUNTER FOR CLOSED FRACTURE: ICD-10-CM

## 2017-03-21 DIAGNOSIS — Z78.9 OTHER SPECIFIED HEALTH STATUS: ICD-10-CM

## 2017-03-21 DIAGNOSIS — Z82.49 FAMILY HISTORY OF ISCHEMIC HEART DISEASE AND OTHER DISEASES OF THE CIRCULATORY SYSTEM: ICD-10-CM

## 2017-03-21 DIAGNOSIS — Z80.0 FAMILY HISTORY OF MALIGNANT NEOPLASM OF DIGESTIVE ORGANS: ICD-10-CM

## 2017-03-21 DIAGNOSIS — I25.708 ATHEROSCLEROSIS OF CORONARY ARTERY BYPASS GRAFT(S), UNSPECIFIED, WITH OTHER FORMS OF ANGINA PECTORIS: ICD-10-CM

## 2017-03-21 RX ORDER — MULTIVITAMIN
TABLET ORAL
Refills: 0 | Status: ACTIVE | COMMUNITY

## 2017-03-21 RX ORDER — SOLIFENACIN SUCCINATE 5 MG/1
5 TABLET, FILM COATED ORAL
Refills: 0 | Status: ACTIVE | COMMUNITY

## 2017-03-21 RX ORDER — ASPIRIN 81 MG
81 TABLET, DELAYED RELEASE (ENTERIC COATED) ORAL
Refills: 0 | Status: ACTIVE | COMMUNITY

## 2017-03-21 RX ORDER — DONEPEZIL HYDROCHLORIDE 10 MG/1
10 TABLET ORAL
Qty: 90 | Refills: 0 | Status: ACTIVE | COMMUNITY
Start: 2016-12-20

## 2017-03-21 RX ORDER — IRON/IRON ASP GLY/FA/MV-MIN 38 125-25-1MG
TABLET ORAL
Refills: 0 | Status: ACTIVE | COMMUNITY

## 2017-05-16 ENCOUNTER — APPOINTMENT (OUTPATIENT)
Dept: SPINE | Facility: CLINIC | Age: 82
End: 2017-05-16

## 2017-05-16 VITALS
HEIGHT: 66 IN | WEIGHT: 118 LBS | DIASTOLIC BLOOD PRESSURE: 77 MMHG | HEART RATE: 79 BPM | BODY MASS INDEX: 18.96 KG/M2 | SYSTOLIC BLOOD PRESSURE: 146 MMHG

## 2017-05-16 DIAGNOSIS — M48.50XA COLLAPSED VERTEBRA, NOT ELSEWHERE CLASSIFIED, SITE UNSPECIFIED, INITIAL ENCOUNTER FOR FRACTURE: ICD-10-CM

## 2017-05-16 RX ORDER — ATORVASTATIN CALCIUM 40 MG/1
40 TABLET, FILM COATED ORAL
Qty: 30 | Refills: 0 | Status: ACTIVE | COMMUNITY
Start: 2017-03-09

## 2017-06-25 ENCOUNTER — INPATIENT (INPATIENT)
Facility: HOSPITAL | Age: 82
LOS: 7 days | Discharge: NURSING, REHAB OR LONG-TERM CARE | DRG: 65 | End: 2017-07-03
Attending: INTERNAL MEDICINE | Admitting: INTERNAL MEDICINE
Payer: MEDICARE

## 2017-06-25 VITALS
OXYGEN SATURATION: 98 % | TEMPERATURE: 97 F | DIASTOLIC BLOOD PRESSURE: 60 MMHG | SYSTOLIC BLOOD PRESSURE: 150 MMHG | HEART RATE: 74 BPM | RESPIRATION RATE: 14 BRPM

## 2017-06-25 DIAGNOSIS — Z95.1 PRESENCE OF AORTOCORONARY BYPASS GRAFT: Chronic | ICD-10-CM

## 2017-06-25 DIAGNOSIS — Z95.1 PRESENCE OF AORTOCORONARY BYPASS GRAFT: ICD-10-CM

## 2017-06-25 DIAGNOSIS — R47.81 SLURRED SPEECH: ICD-10-CM

## 2017-06-25 DIAGNOSIS — I63.9 CEREBRAL INFARCTION, UNSPECIFIED: ICD-10-CM

## 2017-06-25 LAB
ALBUMIN SERPL ELPH-MCNC: 3.1 G/DL — LOW (ref 3.3–5)
ALP SERPL-CCNC: 144 U/L — HIGH (ref 40–120)
ALT FLD-CCNC: 23 U/L — SIGNIFICANT CHANGE UP (ref 12–78)
AMYLASE P1 CFR SERPL: 72 U/L — SIGNIFICANT CHANGE UP (ref 25–115)
ANION GAP SERPL CALC-SCNC: 7 MMOL/L — SIGNIFICANT CHANGE UP (ref 5–17)
APPEARANCE UR: CLEAR — SIGNIFICANT CHANGE UP
APTT BLD: 53.2 SEC — HIGH (ref 27.5–37.4)
AST SERPL-CCNC: 30 U/L — SIGNIFICANT CHANGE UP (ref 15–37)
BASOPHILS # BLD AUTO: 0.1 K/UL — SIGNIFICANT CHANGE UP (ref 0–0.2)
BASOPHILS NFR BLD AUTO: 1.2 % — SIGNIFICANT CHANGE UP (ref 0–2)
BILIRUB SERPL-MCNC: 0.8 MG/DL — SIGNIFICANT CHANGE UP (ref 0.2–1.2)
BILIRUB UR-MCNC: NEGATIVE — SIGNIFICANT CHANGE UP
BUN SERPL-MCNC: 21 MG/DL — SIGNIFICANT CHANGE UP (ref 7–23)
CALCIUM SERPL-MCNC: 7.9 MG/DL — LOW (ref 8.5–10.1)
CHLORIDE SERPL-SCNC: 108 MMOL/L — SIGNIFICANT CHANGE UP (ref 96–108)
CK MB CFR SERPL CALC: 1.4 NG/ML — SIGNIFICANT CHANGE UP (ref 0–3.6)
CO2 SERPL-SCNC: 25 MMOL/L — SIGNIFICANT CHANGE UP (ref 22–31)
COLOR SPEC: YELLOW — SIGNIFICANT CHANGE UP
CREAT SERPL-MCNC: 0.83 MG/DL — SIGNIFICANT CHANGE UP (ref 0.5–1.3)
DIFF PNL FLD: ABNORMAL
EOSINOPHIL # BLD AUTO: 0.2 K/UL — SIGNIFICANT CHANGE UP (ref 0–0.5)
EOSINOPHIL NFR BLD AUTO: 1.6 % — SIGNIFICANT CHANGE UP (ref 0–6)
GLUCOSE SERPL-MCNC: 90 MG/DL — SIGNIFICANT CHANGE UP (ref 70–99)
GLUCOSE UR QL: NEGATIVE — SIGNIFICANT CHANGE UP
HCT VFR BLD CALC: 37.9 % — LOW (ref 39–50)
HGB BLD-MCNC: 11.8 G/DL — LOW (ref 13–17)
INR BLD: 1.08 RATIO — SIGNIFICANT CHANGE UP (ref 0.88–1.16)
KETONES UR-MCNC: NEGATIVE — SIGNIFICANT CHANGE UP
LEUKOCYTE ESTERASE UR-ACNC: NEGATIVE — SIGNIFICANT CHANGE UP
LIDOCAIN IGE QN: 138 U/L — SIGNIFICANT CHANGE UP (ref 73–393)
LYMPHOCYTES # BLD AUTO: 19.3 % — SIGNIFICANT CHANGE UP (ref 13–44)
LYMPHOCYTES # BLD AUTO: 2.2 K/UL — SIGNIFICANT CHANGE UP (ref 1–3.3)
MCHC RBC-ENTMCNC: 23.9 PG — LOW (ref 27–34)
MCHC RBC-ENTMCNC: 31 GM/DL — LOW (ref 32–36)
MCV RBC AUTO: 77 FL — LOW (ref 80–100)
MONOCYTES # BLD AUTO: 0.9 K/UL — SIGNIFICANT CHANGE UP (ref 0–0.9)
MONOCYTES NFR BLD AUTO: 7.8 % — SIGNIFICANT CHANGE UP (ref 1–9)
NEUTROPHILS # BLD AUTO: 7.8 K/UL — HIGH (ref 1.8–7.4)
NEUTROPHILS NFR BLD AUTO: 70.1 % — SIGNIFICANT CHANGE UP (ref 43–77)
NITRITE UR-MCNC: NEGATIVE — SIGNIFICANT CHANGE UP
NT-PROBNP SERPL-SCNC: 1412 PG/ML — HIGH (ref 0–450)
PH UR: 6.5 — SIGNIFICANT CHANGE UP (ref 5–8)
PLATELET # BLD AUTO: 292 K/UL — SIGNIFICANT CHANGE UP (ref 150–400)
POTASSIUM SERPL-MCNC: 4.5 MMOL/L — SIGNIFICANT CHANGE UP (ref 3.5–5.3)
POTASSIUM SERPL-SCNC: 4.5 MMOL/L — SIGNIFICANT CHANGE UP (ref 3.5–5.3)
PROT SERPL-MCNC: 7 G/DL — SIGNIFICANT CHANGE UP (ref 6–8.3)
PROT UR-MCNC: NEGATIVE — SIGNIFICANT CHANGE UP
PROTHROM AB SERPL-ACNC: 11.8 SEC — SIGNIFICANT CHANGE UP (ref 9.8–12.7)
RBC # BLD: 4.92 M/UL — SIGNIFICANT CHANGE UP (ref 4.2–5.8)
RBC # FLD: 16.2 % — HIGH (ref 10.3–14.5)
SODIUM SERPL-SCNC: 140 MMOL/L — SIGNIFICANT CHANGE UP (ref 135–145)
SP GR SPEC: 1.01 — SIGNIFICANT CHANGE UP (ref 1.01–1.02)
TROPONIN I SERPL-MCNC: <.015 NG/ML — SIGNIFICANT CHANGE UP (ref 0.01–0.04)
UROBILINOGEN FLD QL: NEGATIVE — SIGNIFICANT CHANGE UP
WBC # BLD: 11.2 K/UL — HIGH (ref 3.8–10.5)
WBC # FLD AUTO: 11.2 K/UL — HIGH (ref 3.8–10.5)

## 2017-06-25 PROCEDURE — 99285 EMERGENCY DEPT VISIT HI MDM: CPT

## 2017-06-25 PROCEDURE — 71010: CPT | Mod: 26

## 2017-06-25 PROCEDURE — 70450 CT HEAD/BRAIN W/O DYE: CPT | Mod: 26

## 2017-06-25 PROCEDURE — 93010 ELECTROCARDIOGRAM REPORT: CPT

## 2017-06-25 PROCEDURE — 72125 CT NECK SPINE W/O DYE: CPT | Mod: 26

## 2017-06-25 RX ORDER — SODIUM CHLORIDE 9 MG/ML
1000 INJECTION INTRAMUSCULAR; INTRAVENOUS; SUBCUTANEOUS
Qty: 0 | Refills: 0 | Status: DISCONTINUED | OUTPATIENT
Start: 2017-06-25 | End: 2017-07-03

## 2017-06-25 RX ORDER — ACETAMINOPHEN 500 MG
650 TABLET ORAL EVERY 6 HOURS
Qty: 0 | Refills: 0 | Status: DISCONTINUED | OUTPATIENT
Start: 2017-06-25 | End: 2017-07-03

## 2017-06-25 RX ORDER — ASPIRIN/CALCIUM CARB/MAGNESIUM 324 MG
300 TABLET ORAL ONCE
Qty: 0 | Refills: 0 | Status: COMPLETED | OUTPATIENT
Start: 2017-06-25 | End: 2017-06-25

## 2017-06-25 RX ORDER — SODIUM CHLORIDE 9 MG/ML
3 INJECTION INTRAMUSCULAR; INTRAVENOUS; SUBCUTANEOUS ONCE
Qty: 0 | Refills: 0 | Status: COMPLETED | OUTPATIENT
Start: 2017-06-25 | End: 2017-06-25

## 2017-06-25 RX ORDER — ENOXAPARIN SODIUM 100 MG/ML
40 INJECTION SUBCUTANEOUS EVERY 24 HOURS
Qty: 0 | Refills: 0 | Status: DISCONTINUED | OUTPATIENT
Start: 2017-06-25 | End: 2017-07-03

## 2017-06-25 RX ORDER — ONDANSETRON 8 MG/1
4 TABLET, FILM COATED ORAL EVERY 6 HOURS
Qty: 0 | Refills: 0 | Status: DISCONTINUED | OUTPATIENT
Start: 2017-06-25 | End: 2017-07-03

## 2017-06-25 RX ADMIN — SODIUM CHLORIDE 3 MILLILITER(S): 9 INJECTION INTRAMUSCULAR; INTRAVENOUS; SUBCUTANEOUS at 10:27

## 2017-06-25 RX ADMIN — ENOXAPARIN SODIUM 40 MILLIGRAM(S): 100 INJECTION SUBCUTANEOUS at 20:52

## 2017-06-25 RX ADMIN — SODIUM CHLORIDE 75 MILLILITER(S): 9 INJECTION INTRAMUSCULAR; INTRAVENOUS; SUBCUTANEOUS at 15:45

## 2017-06-25 RX ADMIN — Medication 300 MILLIGRAM(S): at 14:18

## 2017-06-25 NOTE — ED PROVIDER NOTE - NIH STROKE SCALE: 4. FACIAL
(3) Complete paralysis of one or both sides (absence of facial movement in the upper and lower face)

## 2017-06-25 NOTE — ED PROVIDER NOTE - CARE PLAN
Principal Discharge DX:	CVA (cerebral vascular accident)  Instructions for follow-up, activity and diet:	admit  Secondary Diagnosis:	CAD (coronary artery disease)  Secondary Diagnosis:	Dementia

## 2017-06-25 NOTE — H&P ADULT - NSHPLABSRESULTS_GEN_ALL_CORE
Lab Results:  CBC  CBC Full  -  ( 2017 09:55 )  WBC Count : 11.2 K/uL  Hemoglobin : 11.8 g/dL  Hematocrit : 37.9 %  Platelet Count - Automated : 292 K/uL  Mean Cell Volume : 77.0 fl  Mean Cell Hemoglobin : 23.9 pg  Mean Cell Hemoglobin Concentration : 31.0 gm/dL  Auto Neutrophil # : 7.8 K/uL  Auto Lymphocyte # : 2.2 K/uL  Auto Monocyte # : 0.9 K/uL  Auto Eosinophil # : 0.2 K/uL  Auto Basophil # : 0.1 K/uL  Auto Neutrophil % : 70.1 %  Auto Lymphocyte % : 19.3 %  Auto Monocyte % : 7.8 %  Auto Eosinophil % : 1.6 %  Auto Basophil % : 1.2 %    .		Differential:	[] Automated		[] Manual  Chemistry                        11.8   11.2  )-----------( 292      ( 2017 09:55 )             37.9     06-25    140  |  108  |  21  ----------------------------<  90  4.5   |  25  |  0.83    Ca    7.9<L>      2017 09:55    TPro  7.0  /  Alb  3.1<L>  /  TBili  0.8  /  DBili  x   /  AST  30  /  ALT  23  /  AlkPhos  144<H>  06-25    LIVER FUNCTIONS - ( 2017 09:55 )  Alb: 3.1 g/dL / Pro: 7.0 g/dL / ALK PHOS: 144 U/L / ALT: 23 U/L / AST: 30 U/L / GGT: x           PT/INR - ( 2017 09:55 )   PT: 11.8 sec;   INR: 1.08 ratio         PTT - ( 2017 09:55 )  PTT:53.2 sec  Urinalysis Basic - ( 2017 11:33 )    Color: Yellow / Appearance: Clear / S.015 / pH: x  Gluc: x / Ketone: Negative  / Bili: Negative / Urobili: Negative   Blood: x / Protein: Negative / Nitrite: Negative   Leuk Esterase: Negative / RBC: 0-2 /HPF / WBC 3-5   Sq Epi: x / Non Sq Epi: x / Bacteria: x            MICROBIOLOGY/CULTURES:      RADIOLOGY RESULTS:  ct reviewed

## 2017-06-25 NOTE — H&P ADULT - HISTORY OF PRESENT ILLNESS
History obtained through the charts    89 yo male who presents to the ED with a co of slurred speech.  Pt does suffer from dementia but is normally alert to person and place.  He is a fall risk but is ambulatory with a walker and assistance.  Per report pt was in his normal state of health yesterday and even when went for a walk.  He went to sleep around 10:30 pm.  Estelle reports that at 5 am she went to check on him because he was snoring loudly.  She noted his mouth open with the facial droop at that time but did not think much.  Normally at this time pt is awake and trying to get out of bed but he remained sleeping and she thought he was tired.  She checked again at 6 am and he was still sleeping.  At a little after 8 am she woke the pt up and noted that his sleep was slurred and that he was not moving his left arm and could not sit up.  She called the pt family who came to check on him and then EMS was called.  Family denies history of previous CVA.  Son dose report that in February pt fell off a chair and suffered a C2 fracture.  The cervical collar was recently removed after several months of treatment.  Aide reports that last Monday pt did strike his head against the door as he was trying to leave the house.  Family further reports that he had 5 teeth removed under local on Monday but had been recovering well.

## 2017-06-25 NOTE — ED PROVIDER NOTE - MEDICAL DECISION MAKING DETAILS
cbc, cmp, amylase, lipase, INR, cardiac enzymes, BNP, UA, urine culture,  EKG, chest x-ray, CT head, CT cervical spine, ASA, observation

## 2017-06-25 NOTE — ED PROVIDER NOTE - CONDUCTED A DETAILED DISCUSSION WITH PATIENT AND/OR GUARDIAN REGARDING, MDM
radiology results/need to admit/return to ED if symptoms worsen, persist or questions arise/lab results

## 2017-06-25 NOTE — ED ADULT TRIAGE NOTE - CHIEF COMPLAINT QUOTE
C/O weakness and slurred speech this morning. Patient woke up with symptoms. As per aide patient was well

## 2017-06-25 NOTE — ED ADULT NURSE NOTE - PLAN OF CARE
Bedside visitors/Fall precautions/Side rails/Call bell/NPO/Explanation of exam/test/Position of comfort

## 2017-06-25 NOTE — ED PROVIDER NOTE - ENMT, MLM
Airway patent, Nasal mucosa clear. Mouth with dry mucosa.  Forced eye deviation to right, Left facial droop, pt with slurred speech Airway patent, Nasal mucosa clear. Mouth with dry mucosa.  Forced eye deviation to right, Left facial droop, pt with slurred speech. Healing contusion above left eye no orbital tenderness or deformity

## 2017-06-25 NOTE — PROGRESS NOTE ADULT - SUBJECTIVE AND OBJECTIVE BOX
neuro cons dict.  likely right mca infarct.  asa suppository .  echo.  carotid doppler.  mri of head.  DW FAMILY.

## 2017-06-25 NOTE — ED ADULT NURSE NOTE - OBJECTIVE STATEMENT
as per pt aide pt went to bed at 2230 last night. woke up this morning with garbled speech and weakness in left arm. pt denies pain, pt with garbled speech but at times its clear. pt able to move right arm and both legs, pt able to follow commands. pt with right upward gaze

## 2017-06-25 NOTE — H&P ADULT - NSHPPHYSICALEXAM_GEN_ALL_CORE
general sleepy  neck supple  Respiratory: CTA B/L  CV: RRR, S1S2, no murmurs, rubs or gallops  Abdominal: Soft, NT, ND +BS, Last BM  Extremities: No edema, + peripheral pulses

## 2017-06-25 NOTE — ED PROVIDER NOTE - OBJECTIVE STATEMENT
Pt is a 89 yo male who presents to the ED with a cc of slurred speech.  History is obtained via aid at bedside.  Pt does suffer from dementia but is normally alert to person and place.  He is a fall risk but is ambulatory with a walker and assistance.  Per report pt was in his normal state of health yesterday and even when went for a walk.  He went to sleep around 10:30 pm.  Aidbob reports that at 5 am she went to check on him because he was snoring loudly.  She noted his mouth open with the facial droop at that time but did not think much.  Normally at this time pt is awake and trying to get out of bed but he remained sleeping and she thought he was tired.  She checked again at 6 am and he was still sleeping.  At a little after 8 am she woke the pt up and noted that his sleep was slurred and that he was not moving his left arm and could not sit up.  She called the pt family who came to check on him and then EMS was called.  Family denies history of previous CVA.  Son dose report that in February pt fell off a chair and suffered a C2 fracture.  The cervical collar was recently removed after several months of treatment.  Aide reports that last Monday pt did strike his head against the door as he was trying to leave the house.  Family further reports that he had 5 teeth removed under local on Monday but had been recovering well.  Finally they recently started giving him a teaspoon of Nite Quil at night to help him sleep per urology recommendations as pt has over active bladder and was having issues with sleep.

## 2017-06-26 LAB
ALBUMIN SERPL ELPH-MCNC: 3 G/DL — LOW (ref 3.3–5)
ALP SERPL-CCNC: 138 U/L — HIGH (ref 40–120)
ALT FLD-CCNC: 19 U/L — SIGNIFICANT CHANGE UP (ref 12–78)
ANION GAP SERPL CALC-SCNC: 8 MMOL/L — SIGNIFICANT CHANGE UP (ref 5–17)
AST SERPL-CCNC: 22 U/L — SIGNIFICANT CHANGE UP (ref 15–37)
BILIRUB SERPL-MCNC: 1 MG/DL — SIGNIFICANT CHANGE UP (ref 0.2–1.2)
BUN SERPL-MCNC: 17 MG/DL — SIGNIFICANT CHANGE UP (ref 7–23)
CALCIUM SERPL-MCNC: 8.3 MG/DL — LOW (ref 8.5–10.1)
CHLORIDE SERPL-SCNC: 107 MMOL/L — SIGNIFICANT CHANGE UP (ref 96–108)
CHOLEST SERPL-MCNC: 149 MG/DL — SIGNIFICANT CHANGE UP (ref 10–199)
CO2 SERPL-SCNC: 25 MMOL/L — SIGNIFICANT CHANGE UP (ref 22–31)
CREAT SERPL-MCNC: 0.69 MG/DL — SIGNIFICANT CHANGE UP (ref 0.5–1.3)
CULTURE RESULTS: NO GROWTH — SIGNIFICANT CHANGE UP
GLUCOSE SERPL-MCNC: 73 MG/DL — SIGNIFICANT CHANGE UP (ref 70–99)
HCT VFR BLD CALC: 37 % — LOW (ref 39–50)
HDLC SERPL-MCNC: 40 MG/DL — SIGNIFICANT CHANGE UP (ref 40–125)
HGB BLD-MCNC: 11.4 G/DL — LOW (ref 13–17)
LIPID PNL WITH DIRECT LDL SERPL: 95 MG/DL — SIGNIFICANT CHANGE UP
MCHC RBC-ENTMCNC: 23.7 PG — LOW (ref 27–34)
MCHC RBC-ENTMCNC: 31 GM/DL — LOW (ref 32–36)
MCV RBC AUTO: 76.7 FL — LOW (ref 80–100)
PLATELET # BLD AUTO: 270 K/UL — SIGNIFICANT CHANGE UP (ref 150–400)
POTASSIUM SERPL-MCNC: 3.9 MMOL/L — SIGNIFICANT CHANGE UP (ref 3.5–5.3)
POTASSIUM SERPL-SCNC: 3.9 MMOL/L — SIGNIFICANT CHANGE UP (ref 3.5–5.3)
PROT SERPL-MCNC: 6.5 G/DL — SIGNIFICANT CHANGE UP (ref 6–8.3)
RBC # BLD: 4.82 M/UL — SIGNIFICANT CHANGE UP (ref 4.2–5.8)
RBC # FLD: 15.9 % — HIGH (ref 10.3–14.5)
SODIUM SERPL-SCNC: 140 MMOL/L — SIGNIFICANT CHANGE UP (ref 135–145)
SPECIMEN SOURCE: SIGNIFICANT CHANGE UP
TOTAL CHOLESTEROL/HDL RATIO MEASUREMENT: 3.7 RATIO — SIGNIFICANT CHANGE UP (ref 3.4–9.6)
TRIGL SERPL-MCNC: 70 MG/DL — SIGNIFICANT CHANGE UP (ref 10–149)
WBC # BLD: 11.5 K/UL — HIGH (ref 3.8–10.5)
WBC # FLD AUTO: 11.5 K/UL — HIGH (ref 3.8–10.5)

## 2017-06-26 PROCEDURE — 93880 EXTRACRANIAL BILAT STUDY: CPT | Mod: 26

## 2017-06-26 RX ORDER — ASPIRIN/CALCIUM CARB/MAGNESIUM 324 MG
300 TABLET ORAL DAILY
Qty: 0 | Refills: 0 | Status: DISCONTINUED | OUTPATIENT
Start: 2017-06-26 | End: 2017-06-30

## 2017-06-26 RX ADMIN — SODIUM CHLORIDE 75 MILLILITER(S): 9 INJECTION INTRAMUSCULAR; INTRAVENOUS; SUBCUTANEOUS at 03:26

## 2017-06-26 RX ADMIN — ENOXAPARIN SODIUM 40 MILLIGRAM(S): 100 INJECTION SUBCUTANEOUS at 20:12

## 2017-06-26 RX ADMIN — Medication 300 MILLIGRAM(S): at 13:15

## 2017-06-26 RX ADMIN — SODIUM CHLORIDE 75 MILLILITER(S): 9 INJECTION INTRAMUSCULAR; INTRAVENOUS; SUBCUTANEOUS at 16:58

## 2017-06-26 NOTE — PHYSICAL THERAPY INITIAL EVALUATION ADULT - RANGE OF MOTION EXAMINATION, REHAB EVAL
deficits as listed below/no ROM deficits were identified/except LUE unable to perform 2/2 weakness.  RUE partial range and tremor

## 2017-06-26 NOTE — PROGRESS NOTE ADULT - ASSESSMENT
Problem/Plan - 1:  ·  Problem: Slurred speech.  Plan: ro sroke  neuro fu  cw asa  speech and swallow evaluation  PT consult  NPO for now.   check mri    Problem/Plan - 2:  ·  Problem: S/P CABG x 3.  Plan: cards called  will monitor.   thomas asa suppository

## 2017-06-26 NOTE — CONSULT NOTE ADULT - SUBJECTIVE AND OBJECTIVE BOX
[facility] Cardiology    CHIEF COMPLAINT: Patient is a 89y old  Male who presents with a chief complaint of sluerred speech (25 Jun 2017 13:43)      HPI:  History obtained through the charts    89 yo male who presents to the ED with a co of slurred speech.  Pt does suffer from dementia but is normally alert to person and place.  He is a fall risk but is ambulatory with a walker and assistance.  Per report pt was in his normal state of health yesterday and even when went for a walk.  He went to sleep around 10:30 pm.  Aide reports that at 5 am she went to check on him because he was snoring loudly.  She noted his mouth open with the facial droop at that time but did not think much.  Normally at this time pt is awake and trying to get out of bed but he remained sleeping and she thought he was tired.  She checked again at 6 am and he was still sleeping.  At a little after 8 am she woke the pt up and noted that his sleep was slurred and that he was not moving his left arm and could not sit up.  She called the pt family who came to check on him and then EMS was called.  Family denies history of previous CVA.  Son dose report that in February pt fell off a chair and suffered a C2 fracture.  The cervical collar was recently removed after several months of treatment.  Aide reports that last Monday pt did strike his head against the door as he was trying to leave the house.  Family further reports that he had 5 teeth removed under local on Monday but had been recovering well. (25 Jun 2017 13:43)    Pt with aphasia, but alert, responsive  Denies CP  Denies SOB    PAST MEDICAL & SURGICAL HISTORY:  CAD (coronary artery disease)  H/O coronary artery bypass surgery  Dementia  S/P CABG x 3    SOCIAL HISTORY: no active tobacco    FAMILY HISTORY:  No pertinent family history in first degree relatives      MEDICATIONS  (STANDING):  enoxaparin Injectable 40milliGRAM(s) SubCutaneous every 24 hours  sodium chloride 0.9%. 1000milliLiter(s) IV Continuous <Continuous>  aspirin Suppository 300milliGRAM(s) Rectal daily    MEDICATIONS  (PRN):  acetaminophen   Tablet 650milliGRAM(s) Oral every 6 hours PRN For Temp greater than 38 C (100.4 F)  acetaminophen   Tablet. 650milliGRAM(s) Oral every 6 hours PRN Mild Pain (1 - 3)  ondansetron Injectable 4milliGRAM(s) IV Push every 6 hours PRN Nausea      Allergies    No Known Allergies    Intolerances      REVIEW OF SYSTEMS:  CONSTITUTIONAL: No weakness, no fevers   EYES/ENT: No visual changes  NECK: No pain or stiffness  RESPIRATORY: No shortness of breath  CARDIOVASCULAR: No chest pain or palpitations  GASTROINTESTINAL: No abdominal pain  GENITOURINARY: No hematuria  NEUROLOGICAL: No weakness  SKIN: No rash  All other review of systems is negative unless indicated above    VITAL SIGNS:   Vital Signs Last 24 Hrs  T(C): 37.2, Max: 37.4 (06-25 @ 20:17)  T(F): 99, Max: 99.3 (06-25 @ 20:17)  HR: 77 (73 - 88)  BP: 148/65 (142/55 - 175/68)  BP(mean): --  RR: 18 (14 - 20)  SpO2: 97% (95% - 100%)  I&O's Summary    PHYSICAL EXAM:  Constitutional: NAD  Neurological: Alert and oriented  HEENT: EOMI, no JVD  Cardiovascular: S1 and S2, reg 2/6 sys murmur  Pulmonary: breath sounds bilaterally clear  Gastrointestinal: Bowel Sounds present, soft, nontender  Ext: peripheral edema, none  Skin: No rashes, No cyanosis.  Psych:  Mood & affect appropriate   LABS: All Labs Reviewed:                        11.8   11.2  )-----------( 292      ( 25 Jun 2017 09:55 )             37.9     06-25    140  |  108  |  21  ----------------------------<  90  4.5   |  25  |  0.83    Ca    7.9<L>      25 Jun 2017 09:55    TPro  7.0  /  Alb  3.1<L>  /  TBili  0.8  /  DBili  x   /  AST  30  /  ALT  23  /  AlkPhos  144<H>  06-25    PT/INR - ( 25 Jun 2017 09:55 )   PT: 11.8 sec;   INR: 1.08 ratio         PTT - ( 25 Jun 2017 09:55 )  PTT:53.2 sec  CARDIAC MARKERS ( 25 Jun 2017 09:55 )  <.015 ng/mL / x     / x     / x     / 1.4 ng/mL      Blood Culture:   06-25 @ 09:55  Pro Bnp 1412      RADIOLOGY/EKG:Diagnosis Line Normal sinus rhythm  Left axis deviation  Incomplete right bundle branch block  Voltage criteria for left ventricular hypertrophy  Cannot rule out Septal infarct (cited on or before 02-DEC-2007)  Abnormal ECG  Confirmed by Clifton Cope MD ([id number]) on 6/26/2017 8:20:10 AM    89 yo male CAD, CABG, dementia, prior EF 38% on [facility] system echo Feb 2017 p/w slurred speech/aphasia, cva.  ? R MCA infarct  F/U neuro eval  ECHO reveals EF 40%, hypokinetic apex/septum, grossly unchanged from baseline, mild to mod AI  Cont ASA  BP targets per neuro  Pt denies any CP or SOB

## 2017-06-26 NOTE — PROGRESS NOTE ADULT - SUBJECTIVE AND OBJECTIVE BOX
Neurology follow up note  COVERING DR HU ARANA89yMale      Interval History:    Patient resting in bed     MEDICATIONS    enoxaparin Injectable 40milliGRAM(s) SubCutaneous every 24 hours  acetaminophen   Tablet 650milliGRAM(s) Oral every 6 hours PRN  acetaminophen   Tablet. 650milliGRAM(s) Oral every 6 hours PRN  ondansetron Injectable 4milliGRAM(s) IV Push every 6 hours PRN  sodium chloride 0.9%. 1000milliLiter(s) IV Continuous <Continuous>  aspirin Suppository 300milliGRAM(s) Rectal daily      Allergies    No Known Allergies    Intolerances          Weight (kg): 72.6 ( @ 14:03)    Vital Signs Last 24 Hrs  T(C): 37.1, Max: 37.4 ( @ 20:17)  T(F): 98.8, Max: 99.3 ( @ 20:17)  HR: 81 (74 - 88)  BP: 155/56 (142/55 - 169/76)  BP(mean): --  RR: 19 (15 - 20)  SpO2: 94% (94% - 98%)      REVIEW OF SYSTEMS: Non Verbal  Limit or unable to obtain secondary to patient's poor mental status.      On Neurological Examination:    Mental Status - Patient is alert, awake,       Does not follow commands    Speech -     Dysarthria                   Cranial Nerves - Pupils 3 mm equal and reactive to light,   extraocular eye movements intact.   smile symmetric  intact bilateral NLF    Motor Exam -   Right upper 4/5  Left upper 1/5  Right lower 4/5  Left lower 1/5    Muscle tone - is normal all over.  No asymmetry is seen.      Sensory    Bilateral intact to light touch    GENERAL Exam: Nontoxic , No Acute Distress   	  HEENT:  normocephalic, atraumatic  		  LUNGS:  Decreased bilaterally  	  HEART: Normal S1S2   No murmur RRR        	  GI/ ABDOMEN:  Soft  Non tender    EXTREMITIES:   No Edema  No Clubbing  No Cyanosis No Edema  	   SKIN: Normal  No Ecchymosis               LABS:  CBC Full  -  ( 2017 09:06 )  WBC Count : 11.5 K/uL  Hemoglobin : 11.4 g/dL  Hematocrit : 37.0 %  Platelet Count - Automated : 270 K/uL  Mean Cell Volume : 76.7 fl  Mean Cell Hemoglobin : 23.7 pg  Mean Cell Hemoglobin Concentration : 31.0 gm/dL  Auto Neutrophil # : x  Auto Lymphocyte # : x  Auto Monocyte # : x  Auto Eosinophil # : x  Auto Basophil # : x  Auto Neutrophil % : x  Auto Lymphocyte % : x  Auto Monocyte % : x  Auto Eosinophil % : x  Auto Basophil % : x    Urinalysis Basic - ( 2017 11:33 )    Color: Yellow / Appearance: Clear / S.015 / pH: x  Gluc: x / Ketone: Negative  / Bili: Negative / Urobili: Negative   Blood: x / Protein: Negative / Nitrite: Negative   Leuk Esterase: Negative / RBC: 0-2 /HPF / WBC 3-5   Sq Epi: x / Non Sq Epi: x / Bacteria: x          140  |  107  |  17  ----------------------------<  73  3.9   |  25  |  0.69    Ca    8.3<L>      2017 09:21    TPro  6.5  /  Alb  3.0<L>  /  TBili  1.0  /  DBili  x   /  AST  22  /  ALT  19  /  AlkPhos  138<H>      Hemoglobin A1C:     LIVER FUNCTIONS - ( 2017 09:21 )  Alb: 3.0 g/dL / Pro: 6.5 g/dL / ALK PHOS: 138 U/L / ALT: 19 U/L / AST: 22 U/L / GGT: x           Vitamin B12   PT/INR - ( 2017 09:55 )   PT: 11.8 sec;   INR: 1.08 ratio         PTT - ( 2017 09:55 )  PTT:53.2 sec      RADIOLOGY    IMPRESSION:  Slurring of speech and left-sided weakness, consistent with right middle cerebral artery territory ischemia.     What could be calculated from NIH 10 stroke scale would be .    I would recommend telemetry evaluation to rule out underlying arrhythmia..  I would recommend MRI/MRA of the brain was resfused by family as per mri depatement   I would recommend to check TSH and lipid panel.  I will start the patient on aspirin 325 mg once a day.  I will recommend cholesterol medications.  I will get Physical Therapy and Occupational Therapy.  keep SBP above 150 and below 190 if possible   The patient was counseled in regards to tobacco use and alcohol use.  I would recommend fall precautions.  I will continue to followup.          Physical therapy evaluation.  OOB to chair/ambulation with assistance only.  Advanced care planning was discussed with family.  Pain is accessed and addressed.  Pt was screened for signs of clinical depression. Appropriate referral made.  Risk of falls accessed. Fall prevention and plan of care was discussed with family.  Pt is screened for tobacco and alcohol use. Counseling was done for smoking and alcohol cessation.  Use of narcotic pain meds was dicussed. Pt is advised to use narcotic meds wisely and to refrain from over using them.  Plan of care was discussed with family. Questions answered.  Would continue to follow.  30 minutes spent on total encounter; more than 50% of the visit was spent counseling and/or coordinating care by the attending physician. Neurology follow up note  COVERING DR HU ARANA89yMale      Interval History:    Patient resting in bed     MEDICATIONS    enoxaparin Injectable 40milliGRAM(s) SubCutaneous every 24 hours  acetaminophen   Tablet 650milliGRAM(s) Oral every 6 hours PRN  acetaminophen   Tablet. 650milliGRAM(s) Oral every 6 hours PRN  ondansetron Injectable 4milliGRAM(s) IV Push every 6 hours PRN  sodium chloride 0.9%. 1000milliLiter(s) IV Continuous <Continuous>  aspirin Suppository 300milliGRAM(s) Rectal daily      Allergies    No Known Allergies    Intolerances          Weight (kg): 72.6 ( @ 14:03)    Vital Signs Last 24 Hrs  T(C): 37.1, Max: 37.4 ( @ 20:17)  T(F): 98.8, Max: 99.3 ( @ 20:17)  HR: 81 (74 - 88)  BP: 155/56 (142/55 - 169/76)  BP(mean): --  RR: 19 (15 - 20)  SpO2: 94% (94% - 98%)      REVIEW OF SYSTEMS: Non Verbal  Limit or unable to obtain secondary to patient's poor mental status.      On Neurological Examination:    Mental Status - Patient is alert, awake,       Does not follow commands    Speech -     Dysarthria                   Cranial Nerves - Pupils 3 mm equal and reactive to light,   extraocular eye movements intact.   smile symmetric  intact bilateral NLF    Motor Exam -   Right upper 4/5  Left upper 1/5  Right lower 4/5  Left lower 1/5    Muscle tone - is normal all over.  No asymmetry is seen.      Sensory    Bilateral intact to light touch    GENERAL Exam: Nontoxic , No Acute Distress   	  HEENT:  normocephalic, atraumatic  		  LUNGS:  Decreased bilaterally  	  HEART: Normal S1S2   No murmur RRR        	  GI/ ABDOMEN:  Soft  Non tender    EXTREMITIES:   No Edema  No Clubbing  No Cyanosis No Edema  	   SKIN: Normal  No Ecchymosis               LABS:  CBC Full  -  ( 2017 09:06 )  WBC Count : 11.5 K/uL  Hemoglobin : 11.4 g/dL  Hematocrit : 37.0 %  Platelet Count - Automated : 270 K/uL  Mean Cell Volume : 76.7 fl  Mean Cell Hemoglobin : 23.7 pg  Mean Cell Hemoglobin Concentration : 31.0 gm/dL  Auto Neutrophil # : x  Auto Lymphocyte # : x  Auto Monocyte # : x  Auto Eosinophil # : x  Auto Basophil # : x  Auto Neutrophil % : x  Auto Lymphocyte % : x  Auto Monocyte % : x  Auto Eosinophil % : x  Auto Basophil % : x    Urinalysis Basic - ( 2017 11:33 )    Color: Yellow / Appearance: Clear / S.015 / pH: x  Gluc: x / Ketone: Negative  / Bili: Negative / Urobili: Negative   Blood: x / Protein: Negative / Nitrite: Negative   Leuk Esterase: Negative / RBC: 0-2 /HPF / WBC 3-5   Sq Epi: x / Non Sq Epi: x / Bacteria: x          140  |  107  |  17  ----------------------------<  73  3.9   |  25  |  0.69    Ca    8.3<L>      2017 09:21    TPro  6.5  /  Alb  3.0<L>  /  TBili  1.0  /  DBili  x   /  AST  22  /  ALT  19  /  AlkPhos  138<H>      Hemoglobin A1C:     LIVER FUNCTIONS - ( 2017 09:21 )  Alb: 3.0 g/dL / Pro: 6.5 g/dL / ALK PHOS: 138 U/L / ALT: 19 U/L / AST: 22 U/L / GGT: x           Vitamin B12   PT/INR - ( 2017 09:55 )   PT: 11.8 sec;   INR: 1.08 ratio         PTT - ( 2017 09:55 )  PTT:53.2 sec      RADIOLOGY    IMPRESSION:  Slurring of speech and left-sided weakness, consistent with right middle cerebral artery territory ischemia.     What could be calculated from NIH 10 stroke scale would be .    I would recommend telemetry evaluation to rule out underlying arrhythmia..  I would recommend MRI/MRA of the brain was resfused by family as per mri depatement will repeat head ct tomorrow   aspirin per  rectum   I will recommend cholesterol medications.  speech and swallow   keep SBP above 150 and below 190 if possible   I will continue to followup.  left message for son mario       Physical therapy evaluation.    30 minutes spent on total encounter; more than 50% of the visit was spent counseling and/or coordinating care by the attending physician.

## 2017-06-26 NOTE — GOALS OF CARE CONVERSATION - PERSONAL ADVANCE DIRECTIVE - NS PRO AD PATIENT TYPE
Medical Orders for Life-Sustaining Treatment (MOLST)/Do Not Resuscitate (DNR)/Living Will/Health Care Proxy (HCP)

## 2017-06-26 NOTE — DISCHARGE NOTE ADULT - CARE PROVIDER_API CALL
Sandra Ellis ([facility]), Neurology  [address] [place] [address]  Phone: [phone]  Fax: [phone]

## 2017-06-26 NOTE — PHYSICAL THERAPY INITIAL EVALUATION ADULT - IMPAIRMENTS FOUND, PT EVAL
cognitive impairment/ROM/decreased midline orientation/arousal, attention, and cognition/fine motor/muscle strength

## 2017-06-26 NOTE — DISCHARGE NOTE ADULT - MEDICATION SUMMARY - MEDICATIONS TO TAKE
I will START or STAY ON the medications listed below when I get home from the hospital:    aspirin 81 mg oral delayed release tablet  -- 1 tab(s) by mouth once a day  -- Indication: For CAD (coronary artery disease)    lisinopril 2.5 mg oral tablet  -- 1 tab(s) by mouth once a day  -- Indication: For CAD (coronary artery disease)    atorvastatin 40 mg oral tablet  -- 1 tab(s) by mouth once a day (at bedtime)  -- Indication: For CAD (coronary artery disease)    carvedilol 3.125 mg oral tablet  -- 1 tab(s) by mouth every 12 hours  -- Indication: For CAD (coronary artery disease)    donepezil 10 mg oral tablet  --  by mouth once a day  -- Indication: For Dementia    potassium chloride 10 mEq oral tablet, extended release  -- 1 tab(s) by mouth once a day  -- Indication: For Supplement

## 2017-06-26 NOTE — PROGRESS NOTE ADULT - SUBJECTIVE AND OBJECTIVE BOX
Patient is a 89y old  Male who presents with a chief complaint of slurred speech (2017 13:43) likely MCA stroke  NAD      INTERVAL HPI/OVERNIGHT EVENTS:  T(C): 37.2, Max: 37.4 ( @ 20:17)  HR: 77 (73 - 88)  BP: 148/65 (142/55 - 175/68)  RR: 18 (14 - 20)  SpO2: 97% (95% - 100%)  Wt(kg): --  I&O's Summary      LABS:                        11.8   11.2  )-----------( 292      ( 2017 09:55 )             37.9         140  |  108  |  21  ----------------------------<  90  4.5   |  25  |  0.83    Ca    7.9<L>      2017 09:55    TPro  7.0  /  Alb  3.1<L>  /  TBili  0.8  /  DBili  x   /  AST  30  /  ALT  23  /  AlkPhos  144<H>  25    PT/INR - ( 2017 09:55 )   PT: 11.8 sec;   INR: 1.08 ratio         PTT - ( 2017 09:55 )  PTT:53.2 sec  Urinalysis Basic - ( 2017 11:33 )    Color: Yellow / Appearance: Clear / S.015 / pH: x  Gluc: x / Ketone: Negative  / Bili: Negative / Urobili: Negative   Blood: x / Protein: Negative / Nitrite: Negative   Leuk Esterase: Negative / RBC: 0-2 /HPF / WBC 3-5   Sq Epi: x / Non Sq Epi: x / Bacteria: x      CAPILLARY BLOOD GLUCOSE        Urinalysis Basic - ( 2017 11:33 )    Color: Yellow / Appearance: Clear / S.015 / pH: x  Gluc: x / Ketone: Negative  / Bili: Negative / Urobili: Negative   Blood: x / Protein: Negative / Nitrite: Negative   Leuk Esterase: Negative / RBC: 0-2 /HPF / WBC 3-5   Sq Epi: x / Non Sq Epi: x / Bacteria: x        MEDICATIONS  (STANDING):  enoxaparin Injectable 40milliGRAM(s) SubCutaneous every 24 hours  sodium chloride 0.9%. 1000milliLiter(s) IV Continuous <Continuous>  aspirin Suppository 300milliGRAM(s) Rectal daily    MEDICATIONS  (PRN):  acetaminophen   Tablet 650milliGRAM(s) Oral every 6 hours PRN For Temp greater than 38 C (100.4 F)  acetaminophen   Tablet. 650milliGRAM(s) Oral every 6 hours PRN Mild Pain (1 - 3)  ondansetron Injectable 4milliGRAM(s) IV Push every 6 hours PRN Nausea          PHYSICAL EXAM:  GENERAL: NAD, well-groomed, well-developed  HEAD:  Atraumatic, Normocephalic  CHEST/LUNG: Clear to percussion bilaterally; No rales, rhonchi, wheezing, or rubs  HEART: Regular rate and rhythm; No murmurs, rubs, or gallops  ABDOMEN: Soft, Nontender, Nondistended; Bowel sounds present  EXTREMITIES:  2+ Peripheral Pulses, No clubbing, cyanosis, or edema  LYMPH: No lymphadenopathy noted  SKIN: No rashes or lesions    Care Discussed with Consultants/Other Providers [ *] YES  [ ] NO

## 2017-06-26 NOTE — GOALS OF CARE CONVERSATION - PERSONAL ADVANCE DIRECTIVE - CONVERSATION DETAILS
met Palmer, pt son, surrogate for medical decisions. katiast reviewed, son agrees to dnr dni. Dr FRANCISCA ceja made aware.  contact # given

## 2017-06-26 NOTE — SWALLOW BEDSIDE ASSESSMENT ADULT - COMMENTS
89 yo male who presents r/o CVA with a co of slurred speech.    Pt lethargic, open mouth posture at rest , eyes briefly opened to verbal / tactile stimuli  Pt at high aspiration and nutritional risk marked by poor strippage of puree bolus and ice chip trials c absent trigger of swallow

## 2017-06-26 NOTE — DISCHARGE NOTE ADULT - CARE PLAN
Principal Discharge DX:	CVA (cerebral vascular accident)  Goal:	conservative management  Instructions for follow-up, activity and diet:	Dysphagia 1 puree honey consistancy

## 2017-06-26 NOTE — DISCHARGE NOTE ADULT - NS AS DC STROKE ED MATERIALS
Advanced age is a risk factor for Strokes/Call 911 for Stroke/Risk Factors for Stroke/Prescribed Medications/Stroke Warning Signs and Symptoms/Need for Followup After Discharge/Stroke Education Booklet Stroke Warning Signs and Symptoms/Prescribed Medications/Need for Followup After Discharge/Call 911 for Stroke/Risk Factors for Stroke/Stroke Education Booklet Stroke Education Booklet/Prescribed Medications/Need for Followup After Discharge/Risk Factors for Stroke/Advanced age is a risk factor for strokes/Stroke Warning Signs and Symptoms/Call 911 for Stroke Stroke Education Booklet/Risk Factors for Stroke/Stroke Warning Signs and Symptoms/Prescribed Medications/Need for Followup After Discharge/Call 911 for Stroke/Advanced age is a risk factor for strokes

## 2017-06-26 NOTE — SPEECH LANGUAGE PATHOLOGY EVALUATION - COMMENTS
89 yo male who presents r/o CVA with a co of slurred speech c pmhx of Dementia  Pt lethargic, open mouth posture at rest , eyes briefly opened to verbal / tactile stimuli  pt non verbal c clinician, decreased ability to follow commands

## 2017-06-26 NOTE — DISCHARGE NOTE ADULT - MEDICATION SUMMARY - MEDICATIONS TO STOP TAKING
I will STOP taking the medications listed below when I get home from the hospital:    senna oral tablet  -- 2 tab(s) by mouth once a day (at bedtime), As needed, Constipation    multivitamin  -- 1 tab(s) by mouth once a day

## 2017-06-26 NOTE — GOALS OF CARE CONVERSATION - PERSONAL ADVANCE DIRECTIVE - NS PRO AD PATIENT TYPE ON CHART
Medical Orders for Life-Sustaining Treatment (MOLST)/molst completed 6/26/17/Do Not Resuscitate (DNR)

## 2017-06-26 NOTE — DISCHARGE NOTE ADULT - PATIENT PORTAL LINK FT
“You can access the Follow[facility] Patient Portal, offered by [facility], by registering with the following website: [email or web address]”

## 2017-06-27 LAB
ALBUMIN SERPL ELPH-MCNC: 2.9 G/DL — LOW (ref 3.3–5)
ALP SERPL-CCNC: 129 U/L — HIGH (ref 40–120)
ALT FLD-CCNC: 18 U/L — SIGNIFICANT CHANGE UP (ref 12–78)
ANION GAP SERPL CALC-SCNC: 10 MMOL/L — SIGNIFICANT CHANGE UP (ref 5–17)
AST SERPL-CCNC: 24 U/L — SIGNIFICANT CHANGE UP (ref 15–37)
BILIRUB SERPL-MCNC: 1.1 MG/DL — SIGNIFICANT CHANGE UP (ref 0.2–1.2)
BUN SERPL-MCNC: 17 MG/DL — SIGNIFICANT CHANGE UP (ref 7–23)
CALCIUM SERPL-MCNC: 8.1 MG/DL — LOW (ref 8.5–10.1)
CHLORIDE SERPL-SCNC: 107 MMOL/L — SIGNIFICANT CHANGE UP (ref 96–108)
CO2 SERPL-SCNC: 23 MMOL/L — SIGNIFICANT CHANGE UP (ref 22–31)
CREAT SERPL-MCNC: 0.67 MG/DL — SIGNIFICANT CHANGE UP (ref 0.5–1.3)
GLUCOSE SERPL-MCNC: 68 MG/DL — LOW (ref 70–99)
HCT VFR BLD CALC: 36.8 % — LOW (ref 39–50)
HGB BLD-MCNC: 11.4 G/DL — LOW (ref 13–17)
MCHC RBC-ENTMCNC: 23.5 PG — LOW (ref 27–34)
MCHC RBC-ENTMCNC: 30.9 GM/DL — LOW (ref 32–36)
MCV RBC AUTO: 75.9 FL — LOW (ref 80–100)
PLATELET # BLD AUTO: 257 K/UL — SIGNIFICANT CHANGE UP (ref 150–400)
POTASSIUM SERPL-MCNC: 3.6 MMOL/L — SIGNIFICANT CHANGE UP (ref 3.5–5.3)
POTASSIUM SERPL-SCNC: 3.6 MMOL/L — SIGNIFICANT CHANGE UP (ref 3.5–5.3)
PROT SERPL-MCNC: 6.3 G/DL — SIGNIFICANT CHANGE UP (ref 6–8.3)
RBC # BLD: 4.85 M/UL — SIGNIFICANT CHANGE UP (ref 4.2–5.8)
RBC # FLD: 16 % — HIGH (ref 10.3–14.5)
SODIUM SERPL-SCNC: 140 MMOL/L — SIGNIFICANT CHANGE UP (ref 135–145)
WBC # BLD: 12.9 K/UL — HIGH (ref 3.8–10.5)
WBC # FLD AUTO: 12.9 K/UL — HIGH (ref 3.8–10.5)

## 2017-06-27 PROCEDURE — 70450 CT HEAD/BRAIN W/O DYE: CPT | Mod: 26

## 2017-06-27 RX ORDER — ATORVASTATIN CALCIUM 80 MG/1
20 TABLET, FILM COATED ORAL AT BEDTIME
Qty: 0 | Refills: 0 | Status: DISCONTINUED | OUTPATIENT
Start: 2017-06-27 | End: 2017-07-03

## 2017-06-27 RX ADMIN — Medication 300 MILLIGRAM(S): at 12:30

## 2017-06-27 RX ADMIN — SODIUM CHLORIDE 75 MILLILITER(S): 9 INJECTION INTRAMUSCULAR; INTRAVENOUS; SUBCUTANEOUS at 21:50

## 2017-06-27 RX ADMIN — ENOXAPARIN SODIUM 40 MILLIGRAM(S): 100 INJECTION SUBCUTANEOUS at 21:50

## 2017-06-27 NOTE — PROGRESS NOTE ADULT - SUBJECTIVE AND OBJECTIVE BOX
Neurology follow up note  COVERING DR HU [id number]      Interval History:    Patient awake in bed     MEDICATIONS    enoxaparin Injectable 40 milliGRAM(s) SubCutaneous every 24 hours  acetaminophen   Tablet 650 milliGRAM(s) Oral every 6 hours PRN  acetaminophen   Tablet. 650 milliGRAM(s) Oral every 6 hours PRN  ondansetron Injectable 4 milliGRAM(s) IV Push every 6 hours PRN  sodium chloride 0.9%. 1000 milliLiter(s) IV Continuous <Continuous>  aspirin Suppository 300 milliGRAM(s) Rectal daily  atorvastatin 20 milliGRAM(s) Oral at bedtime      Allergies    No Known Allergies    Intolerances            Vital Signs Last 24 Hrs  T(C): 36.9 (27 Jun 2017 11:53), Max: 37.4 (27 Jun 2017 04:25)  T(F): 98.4 (27 Jun 2017 11:53), Max: 99.4 (27 Jun 2017 04:25)  HR: 70 (27 Jun 2017 11:53) (67 - 84)  BP: 177/75 (27 Jun 2017 11:53) (160/83 - 177/75)  BP(mean): --  RR: 18 (27 Jun 2017 11:53) (18 - 19)  SpO2: 92% (27 Jun 2017 11:53) (92% - 97%)      REVIEW OF SYSTEMS: Non Verbal  Limit or unable to obtain secondary to patient's poor mental status.      On Neurological Examination:    Mental Status - Patient is alert, awake,       Does not follow commands    Speech -     Dysarthria                   Cranial Nerves - Pupils 3 mm equal and reactive to light,   extraocular eye movements intact.   smile symmetric  intact bilateral NLF    Motor Exam -   Right upper 4/5  Left upper 1/5  Right lower 3/5  Left lower 3/5    Muscle tone - is normal all over.  No asymmetry is seen.      Sensory    Bilateral intact to light touch    GENERAL Exam: Nontoxic , No Acute Distress   	  HEENT:  normocephalic, atraumatic  		  LUNGS:  Decreased bilaterally  	  HEART: Normal S1S2   No murmur RRR        	  GI/ ABDOMEN:  Soft  Non tender    EXTREMITIES:   No Edema  No Clubbing  No Cyanosis No Edema  	   SKIN: Normal  No Ecchymosis                 LABS:  CBC Full  -  ( 27 Jun 2017 07:03 )  WBC Count : 12.9 K/uL  Hemoglobin : 11.4 g/dL  Hematocrit : 36.8 %  Platelet Count - Automated : 257 K/uL  Mean Cell Volume : 75.9 fl  Mean Cell Hemoglobin : 23.5 pg  Mean Cell Hemoglobin Concentration : 30.9 gm/dL  Auto Neutrophil # : x  Auto Lymphocyte # : x  Auto Monocyte # : x  Auto Eosinophil # : x  Auto Basophil # : x  Auto Neutrophil % : x  Auto Lymphocyte % : x  Auto Monocyte % : x  Auto Eosinophil % : x  Auto Basophil % : x      06-27    140  |  107  |  17  ----------------------------<  68<L>  3.6   |  23  |  0.67    Ca    8.1<L>      27 Jun 2017 07:03    TPro  6.3  /  Alb  2.9<L>  /  TBili  1.1  /  DBili  x   /  AST  24  /  ALT  18  /  AlkPhos  129<H>  06-27    Hemoglobin A1C:     LIVER FUNCTIONS - ( 27 Jun 2017 07:03 )  Alb: 2.9 g/dL / Pro: 6.3 g/dL / ALK PHOS: 129 U/L / ALT: 18 U/L / AST: 24 U/L / GGT: x           Vitamin B12         RADIOLOGY    IMPRESSION:  Slurring of speech and left-sided weakness, consistent with right middle cerebral artery territory ischemia.     What could be calculated from NIH 10 stroke scale would be .    I would recommend telemetry evaluation to rule out underlying arrhythmia..   ct head _ CVA  aspirin per  rectum   I will recommend cholesterol medications.  speech and swallow-- failed   keep SBP above 150 and below 190 if possible   I will continue to followup.  left message for son mario  6/27/17      Physical therapy evaluation.    30 minutes spent on total encounter; more than 50% of the visit was spent counseling and/or coordinating care by the attending physician.

## 2017-06-27 NOTE — PROGRESS NOTE ADULT - SUBJECTIVE AND OBJECTIVE BOX
[facility]iology Progress Note [phone] (Dr. Jeong, Kimberly, Darci, Sandhya)    CHIEF COMPLAINT: Patient is a 89y old  Male who presents with a chief complaint of sluerred speech (26 Jun 2017 11:31)      Follow Up Today: The patient denies any chest discomfort or shortness of breath.    HPI:  History obtained through the charts    88 yo male who presents to the ED with a co of slurred speech.  Pt does suffer from dementia but is normally alert to person and place.  He is a fall risk but is ambulatory with a walker and assistance.  Per report pt was in his normal state of health yesterday and even when went for a walk.  He went to sleep around 10:30 pm.  Aide reports that at 5 am she went to check on him because he was snoring loudly.  She noted his mouth open with the facial droop at that time but did not think much.  Normally at this time pt is awake and trying to get out of bed but he remained sleeping and she thought he was tired.  She checked again at 6 am and he was still sleeping.  At a little after 8 am she woke the pt up and noted that his sleep was slurred and that he was not moving his left arm and could not sit up.  She called the pt family who came to check on him and then EMS was called.  Family denies history of previous CVA.  Son dose report that in February pt fell off a chair and suffered a C2 fracture.  The cervical collar was recently removed after several months of treatment.  Aide reports that last Monday pt did strike his head against the door as he was trying to leave the house.  Family further reports that he had 5 teeth removed under local on Monday but had been recovering well. (25 Jun 2017 13:43)      PAST MEDICAL & SURGICAL HISTORY:  CAD (coronary artery disease)  Dementia  S/P CABG x 3      MEDICATIONS  (STANDING):  enoxaparin Injectable 40 milliGRAM(s) SubCutaneous every 24 hours  sodium chloride 0.9%. 1000 milliLiter(s) (75 mL/Hr) IV Continuous <Continuous>  aspirin Suppository 300 milliGRAM(s) Rectal daily    MEDICATIONS  (PRN):  acetaminophen   Tablet 650 milliGRAM(s) Oral every 6 hours PRN For Temp greater than 38 C (100.4 F)  acetaminophen   Tablet. 650 milliGRAM(s) Oral every 6 hours PRN Mild Pain (1 - 3)  ondansetron Injectable 4 milliGRAM(s) IV Push every 6 hours PRN Nausea      Allergies    No Known Allergies    Intolerances        REVIEW OF SYSTEMS:    All other review of systems is negative unless indicated above    Vital Signs Last 24 Hrs  T(C): 37 (27 Jun 2017 07:32), Max: 37.4 (27 Jun 2017 04:25)  T(F): 98.6 (27 Jun 2017 07:32), Max: 99.4 (27 Jun 2017 04:25)  HR: 67 (27 Jun 2017 07:32) (67 - 84)  BP: 167/69 (27 Jun 2017 07:32) (156/80 - 176/71)  BP(mean): --  RR: 18 (27 Jun 2017 07:32) (18 - 19)  SpO2: 97% (27 Jun 2017 07:32) (94% - 98%)    I&O's Summary    26 Jun 2017 07:01  -  27 Jun 2017 07:00  --------------------------------------------------------  IN: 1425 mL / OUT: 2 mL / NET: 1423 mL        PHYSICAL EXAM:    Constitutional: NAD  Neurological: Alert and oriented  HEENT: EOMI, no JVD  Cardiovascular: S1 and S2, reg 2/6 sys murmur  Pulmonary: breath sounds bilaterally clear  Gastrointestinal: Bowel Sounds present, soft, nontender  Ext: peripheral edema, none  Skin: No rashes, No cyanosis.  Psych:  Mood & affect appropriate     LABS: All Labs Reviewed:                        11.4   12.9  )-----------( 257      ( 27 Jun 2017 07:03 )             36.8                         11.4   11.5  )-----------( 270      ( 26 Jun 2017 09:06 )             37.0                         11.8   11.2  )-----------( 292      ( 25 Jun 2017 09:55 )             37.9     27 Jun 2017 07:03    140    |  107    |  17     ----------------------------<  68     3.6     |  23     |  0.67   26 Jun 2017 09:21    140    |  107    |  17     ----------------------------<  73     3.9     |  25     |  0.69   25 Jun 2017 09:55    140    |  108    |  21     ----------------------------<  90     4.5     |  25     |  0.83     Ca    8.1        27 Jun 2017 07:03  Ca    8.3        26 Jun 2017 09:21  Ca    7.9        25 Jun 2017 09:55    TPro  6.3    /  Alb  2.9    /  TBili  1.1    /  DBili  x      /  AST  24     /  ALT  18     /  AlkPhos  129    27 Jun 2017 07:03  TPro  6.5    /  Alb  3.0    /  TBili  1.0    /  DBili  x      /  AST  22     /  ALT  19     /  AlkPhos  138    26 Jun 2017 09:21  TPro  7.0    /  Alb  3.1    /  TBili  0.8    /  DBili  x      /  AST  30     /  ALT  23     /  AlkPhos  144    25 Jun 2017 09:55    PT/INR - ( 25 Jun 2017 09:55 )   PT: 11.8 sec;   INR: 1.08 ratio         PTT - ( 25 Jun 2017 09:55 )  PTT:53.2 sec  CARDIAC MARKERS ( 25 Jun 2017 09:55 )  <.015 ng/mL / x     / x     / x     / 1.4 ng/mL      Blood Culture:   06-25 @ 09:55  Pro Bnp 1412        RADIOLOGY/EKG:    < from: TTE Echo Doppler w/o Cont (06.26.17 @ 09:11) >  CONCLUSIONS:  Ejection fraction appears 40%, in some views appears 40-45%. Prior   ejection fraction was 38% in February 2017. Hypokinesia of the apical and   mid septal segments. Mild to moderate AI, no significant pericardial   effusion    < from: 12 Lead ECG (06.25.17 @ 09:41) >  Diagnosis Line Normal sinus rhythm  Left axis deviation  Incomplete right bundle branch block  Voltage criteria for left ventricular hypertrophy  Cannot rule out Septal infarct (cited on or before 02-DEC-2007)  Abnormal ECG  Confirmed by Anatoliy MORALES, Clifton ([id number]) on 6/26/2017 8:20:10 AM    < from: CT Head No Cont (06.27.17 @ 07:55) >  IMPRESSION:     Subacute appearing infarct in the right MCA distribution which has   evolved since the prior study dated June 25, 2017. No evidence of   hemorrhagic transformation.        Attending Attestation:   20 minutes spent on total encounter; more than 50% of the visit was spent counseling and/or coordinating care by the attending physician.     ASSESSMENT AND PLAN [facility] Card[facility] Progress Note [phone] (Dr. Jeong, Kimberly, Darci, Sandhya)    CHIEF COMPLAINT: Patient is a 89y old  Male who presents with a chief complaint of sluerred speech (26 Jun 2017 11:31)    Follow Up Today: The patient is examined slumped to the right in the bed with slurred speech.  He denies any chest discomfort or shortness of breath.      PAST MEDICAL & SURGICAL HISTORY:  CAD (coronary artery disease)  Dementia  S/P CABG x 3      MEDICATIONS  (STANDING):  enoxaparin Injectable 40 milliGRAM(s) SubCutaneous every 24 hours  sodium chloride 0.9%. 1000 milliLiter(s) (75 mL/Hr) IV Continuous <Continuous>  aspirin Suppository 300 milliGRAM(s) Rectal daily    MEDICATIONS  (PRN):  acetaminophen   Tablet 650 milliGRAM(s) Oral every 6 hours PRN For Temp greater than 38 C (100.4 F)  acetaminophen   Tablet. 650 milliGRAM(s) Oral every 6 hours PRN Mild Pain (1 - 3)  ondansetron Injectable 4 milliGRAM(s) IV Push every 6 hours PRN Nausea      Allergies    No Known Allergies    Intolerances        REVIEW OF SYSTEMS:    All other review of systems is negative unless indicated above    Vital Signs Last 24 Hrs  T(C): 37 (27 Jun 2017 07:32), Max: 37.4 (27 Jun 2017 04:25)  T(F): 98.6 (27 Jun 2017 07:32), Max: 99.4 (27 Jun 2017 04:25)  HR: 67 (27 Jun 2017 07:32) (67 - 84)  BP: 167/69 (27 Jun 2017 07:32) (156/80 - 176/71)  BP(mean): --  RR: 18 (27 Jun 2017 07:32) (18 - 19)  SpO2: 97% (27 Jun 2017 07:32) (94% - 98%)    I&O's Summary    26 Jun 2017 07:01  -  27 Jun 2017 07:00  --------------------------------------------------------  IN: 1425 mL / OUT: 2 mL / NET: 1423 mL        PHYSICAL EXAM:    Constitutional: NAD  Neurological: Alert and oriented  HEENT: EOMI, no JVD  Cardiovascular: S1 and S2, reg 2/6 sys murmur  Pulmonary: breath sounds bilaterally clear  Gastrointestinal: Bowel Sounds present, soft, nontender  Ext: peripheral edema, none  Skin: No rashes, No cyanosis.  Psych:  Mood & affect appropriate     LABS: All Labs Reviewed:                        11.4   12.9  )-----------( 257      ( 27 Jun 2017 07:03 )             36.8                         11.4   11.5  )-----------( 270      ( 26 Jun 2017 09:06 )             37.0                         11.8   11.2  )-----------( 292      ( 25 Jun 2017 09:55 )             37.9     27 Jun 2017 07:03    140    |  107    |  17     ----------------------------<  68     3.6     |  23     |  0.67   26 Jun 2017 09:21    140    |  107    |  17     ----------------------------<  73     3.9     |  25     |  0.69   25 Jun 2017 09:55    140    |  108    |  21     ----------------------------<  90     4.5     |  25     |  0.83     Ca    8.1        27 Jun 2017 07:03  Ca    8.3        26 Jun 2017 09:21  Ca    7.9        25 Jun 2017 09:55    TPro  6.3    /  Alb  2.9    /  TBili  1.1    /  DBili  x      /  AST  24     /  ALT  18     /  AlkPhos  129    27 Jun 2017 07:03  TPro  6.5    /  Alb  3.0    /  TBili  1.0    /  DBili  x      /  AST  22     /  ALT  19     /  AlkPhos  138    26 Jun 2017 09:21  TPro  7.0    /  Alb  3.1    /  TBili  0.8    /  DBili  x      /  AST  30     /  ALT  23     /  AlkPhos  144    25 Jun 2017 09:55    PT/INR - ( 25 Jun 2017 09:55 )   PT: 11.8 sec;   INR: 1.08 ratio         PTT - ( 25 Jun 2017 09:55 )  PTT:53.2 sec  CARDIAC MARKERS ( 25 Jun 2017 09:55 )  <.015 ng/mL / x     / x     / x     / 1.4 ng/mL      Blood Culture:   06-25 @ 09:55  Pro Bnp 1412        RADIOLOGY/EKG:    < from: TTE Echo Doppler w/o Cont (06.26.17 @ 09:11) >  CONCLUSIONS:  Ejection fraction appears 40%, in some views appears 40-45%. Prior   ejection fraction was 38% in February 2017. Hypokinesia of the apical and   mid septal segments. Mild to moderate AI, no significant pericardial   effusion    < from: 12 Lead ECG (06.25.17 @ 09:41) >  Diagnosis Line Normal sinus rhythm  Left axis deviation  Incomplete right bundle branch block  Voltage criteria for left ventricular hypertrophy  Cannot rule out Septal infarct (cited on or before 02-DEC-2007)  Abnormal ECG  Confirmed by Clifton Cope MD ([id number]) on 6/26/2017 8:20:10 AM    < from: CT Head No Cont (06.27.17 @ 07:55) >  IMPRESSION:     Subacute appearing infarct in the right MCA distribution which has   evolved since the prior study dated June 25, 2017. No evidence of   hemorrhagic transformation.        Attending Attestation:   20 minutes spent on total encounter; more than 50% of the visit was spent counseling and/or coordinating care by the attending physician.     ASSESSMENT AND PLAN

## 2017-06-27 NOTE — CONSULT NOTE ADULT - SUBJECTIVE AND OBJECTIVE BOX
Chief Complaint:  Patient is a 89y old  Male who presents with a chief complaint of sluerred speech (2017 11:31)    89 yo male who presents to the ED with a co of slurred speech.  Pt does suffer from dementia but is normally alert to person and place.  He is a fall risk but is ambulatory with a walker and assistance.  Per report pt was in his normal state of health yesterday and even when went for a walk.  He went to sleep around 10:30 pm.  Aide reports that at 5 am she went to check on him because he was snoring loudly.  She noted his mouth open with the facial droop at that time but did not think much.  Normally at this time pt is awake and trying to get out of bed but he remained sleeping and she thought he was tired.  She checked again at 6 am and he was still sleeping.  At a little after 8 am she woke the pt up and noted that his sleep was slurred and that he was not moving his left arm and could not sit up.  She called the pt family who came to check on him and then EMS was called.  Family denies history of previous CVA.  Son dose report that in February pt fell off a chair and suffered a C2 fracture.  The cervical collar was recently removed after several months of treatment.  Aide reports that last Monday pt did strike his head against the door as he was trying to leave the house.  Family further reports that he had 5 teeth removed under local on Monday but had been recovering well. decreased po intake      Allergies:  No Known Allergies      Medications:  enoxaparin Injectable 40 milliGRAM(s) SubCutaneous every 24 hours  acetaminophen   Tablet 650 milliGRAM(s) Oral every 6 hours PRN  acetaminophen   Tablet. 650 milliGRAM(s) Oral every 6 hours PRN  ondansetron Injectable 4 milliGRAM(s) IV Push every 6 hours PRN  sodium chloride 0.9%. 1000 milliLiter(s) IV Continuous <Continuous>  aspirin Suppository 300 milliGRAM(s) Rectal daily  atorvastatin 20 milliGRAM(s) Oral at bedtime      PMHX/PSHX:  CAD (coronary artery disease)  H/O coronary artery bypass surgery  Dementia  S/P CABG x 3      Family history:  No pertinent family history in first degree relatives      Social History: no etoh no cigs lives with some assisstance    ROS:     General:  No wt loss, fevers, chills, night sweats, fatigue,   Eyes:  Good vision, no reported pain  ENT:  No sore throat, pain, runny nose, dysphagia  CV:  No pain, palpitations, hypo/hypertension  Resp:  No dyspnea, cough, tachypnea, wheezing  GI:  No pain, No nausea, No vomiting, No diarrhea, No constipation, No weight loss, No fever, No pruritis, No rectal bleeding, No tarry stools, No dysphagia,  :  No pain, bleeding, incontinence, nocturia  Muscle:  No pain, weakness  Neuro:  No weakness, tingling, memory problems  Psych:  No fatigue, insomnia, mood problems, depression  Endocrine:  No polyuria, polydipsia, cold/heat intolerance  Heme:  No petechiae, ecchymosis, easy bruisability  Skin:  No rash, tattoos, scars, edema      PHYSICAL EXAM:   Vital Signs:  Vital Signs Last 24 Hrs  T(C): 37.4 (2017 19:56), Max: 37.6 (2017 15:31)  T(F): 99.3 (2017 19:56), Max: 99.7 (2017 15:31)  HR: 81 (2017 19:56) (67 - 81)  BP: 177/78 (2017 19:56) (167/69 - 188/81)  BP(mean): --  RR: 18 (2017 19:56) (18 - 19)  SpO2: 96% (2017 19:56) (92% - 97%)  Daily     Daily Weight in k.5 (2017 14:59)    GENERAL:  Appears stated age, well-groomed, well-nourished, no distress  HEENT:  NC/AT,  conjunctivae clear and pink, no thyromegaly, nodules, adenopathy, no JVD, sclera -anicteric  CHEST:  Full & symmetric excursion, no increased effort, breath sounds clear  HEART:  Regular rhythm, S1, S2, no murmur/rub/S3/S4, no abdominal bruit, no edema  ABDOMEN:  Soft, non-tender, non-distended, normoactive bowel sounds,  no masses ,no hepato-splenomegaly, no signs of chronic liver disease  EXTEREMITIES:  no cyanosis,clubbing or edema  SKIN:  No rash/erythema/ecchymoses/petechiae/wounds/abscess/warm/dry  NEURO:  Alert, oriented, no asterixis, no tremor, no encephalopathy    LABS:                        11.4   12.9  )-----------( 257      ( 2017 07:03 )             36.8         140  |  107  |  17  ----------------------------<  68<L>  3.6   |  23  |  0.67    Ca    8.1<L>      2017 07:03    TPro  6.3  /  Alb  2.9<L>  /  TBili  1.1  /  DBili  x   /  AST  24  /  ALT  18  /  AlkPhos  129<H>      LIVER FUNCTIONS - ( 2017 07:03 )  Alb: 2.9 g/dL / Pro: 6.3 g/dL / ALK PHOS: 129 U/L / ALT: 18 U/L / AST: 24 U/L / GGT: x                   Imaging:

## 2017-06-27 NOTE — DIETITIAN INITIAL EVALUATION ADULT. - OTHER INFO
Impaired speech and swallow ability reported, with recommendation for pt to remain NPO by SLP on 6/26.

## 2017-06-27 NOTE — PROGRESS NOTE ADULT - SUBJECTIVE AND OBJECTIVE BOX
Patient is a 89y old  Male who presents with a chief complaint of sluerred speech (2017 11:31)  nonverbal, going for CT scan      INTERVAL HPI/OVERNIGHT EVENTS:  T(C): 37.4 (17 @ 04:25), Max: 37.4 (17 @ 04:25)  HR: 76 (17 @ 04:25) (71 - 84)  BP: 169/71 (17 @ 04:25) (155/56 - 176/71)  RR: 18 (17 @ 04:25) (18 - 19)  SpO2: 94% (17 @ 04:25) (94% - 98%)  Wt(kg): --  I&O's Summary    2017 07:01  -  2017 07:00  --------------------------------------------------------  IN: 1425 mL / OUT: 2 mL / NET: 1423 mL        LABS:                        11.4   12.9  )-----------( 257      ( 2017 07:03 )             36.8     06-26    140  |  107  |  17  ----------------------------<  73  3.9   |  25  |  0.69    Ca    8.3<L>      2017 09:21    TPro  6.5  /  Alb  3.0<L>  /  TBili  1.0  /  DBili  x   /  AST  22  /  ALT  19  /  AlkPhos  138<H>  -26    PT/INR - ( 2017 09:55 )   PT: 11.8 sec;   INR: 1.08 ratio         PTT - ( 2017 09:55 )  PTT:53.2 sec  Urinalysis Basic - ( 2017 11:33 )    Color: Yellow / Appearance: Clear / S.015 / pH: x  Gluc: x / Ketone: Negative  / Bili: Negative / Urobili: Negative   Blood: x / Protein: Negative / Nitrite: Negative   Leuk Esterase: Negative / RBC: 0-2 /HPF / WBC 3-5   Sq Epi: x / Non Sq Epi: x / Bacteria: x      CAPILLARY BLOOD GLUCOSE            Urinalysis Basic - ( 2017 11:33 )    Color: Yellow / Appearance: Clear / S.015 / pH: x  Gluc: x / Ketone: Negative  / Bili: Negative / Urobili: Negative   Blood: x / Protein: Negative / Nitrite: Negative   Leuk Esterase: Negative / RBC: 0-2 /HPF / WBC 3-5   Sq Epi: x / Non Sq Epi: x / Bacteria: x        MEDICATIONS  (STANDING):  enoxaparin Injectable 40 milliGRAM(s) SubCutaneous every 24 hours  sodium chloride 0.9%. 1000 milliLiter(s) (75 mL/Hr) IV Continuous <Continuous>  aspirin Suppository 300 milliGRAM(s) Rectal daily    MEDICATIONS  (PRN):  acetaminophen   Tablet 650 milliGRAM(s) Oral every 6 hours PRN For Temp greater than 38 C (100.4 F)  acetaminophen   Tablet. 650 milliGRAM(s) Oral every 6 hours PRN Mild Pain (1 - 3)  ondansetron Injectable 4 milliGRAM(s) IV Push every 6 hours PRN Nausea          PHYSICAL EXAM:  GENERAL: NAD, well-groomed, well-developed  HEAD:  Atraumatic, Normocephalic  CHEST/LUNG: Clear to percussion bilaterally; No rales, rhonchi, wheezing, or rubs  HEART: Regular rate and rhythm; No murmurs, rubs, or gallops  ABDOMEN: Soft, Nontender, Nondistended; Bowel sounds present  EXTREMITIES:  2+ Peripheral Pulses, No clubbing, cyanosis, or edema  LYMPH: No lymphadenopathy noted  SKIN: No rashes or lesions    Care Discussed with Consultants/Other Providers [ ] YES  [ ] NO

## 2017-06-27 NOTE — PROGRESS NOTE ADULT - ASSESSMENT
88 yo male CAD, CABG, dementia, prior EF 38% on [facility] system echo Feb 2017 p/w slurred speech/aphasia, found with Subacute R MCA infarct  F/U neuro eval  ECHO reveals EF 40%, hypokinetic apex/septum, grossly unchanged from baseline, mild to mod AI  Cont ASA  BP targets per neuro  Overall poor prognosis 88 yo male CAD, CABG, dementia, prior EF 38% on [facility] system echo Feb 2017 p/w slurred speech/aphasia, found with Subacute R MCA infarct  F/U neuro eval  ECHO reveals EF 40%, hypokinetic apex/septum, grossly unchanged from baseline, mild to mod AI  Cont ASA.  Start Lipitor 20mg daily.  Start coreg 3.125 mg bid if ok with neuro.  needs ACE if EF <40%   BP targets per neuro  Overall poor prognosis  Will follow

## 2017-06-28 DIAGNOSIS — I69.391 DYSPHAGIA FOLLOWING CEREBRAL INFARCTION: ICD-10-CM

## 2017-06-28 DIAGNOSIS — G30.8 OTHER ALZHEIMER'S DISEASE: ICD-10-CM

## 2017-06-28 LAB
ALBUMIN SERPL ELPH-MCNC: 2.9 G/DL — LOW (ref 3.3–5)
ALP SERPL-CCNC: 129 U/L — HIGH (ref 40–120)
ALT FLD-CCNC: 15 U/L — SIGNIFICANT CHANGE UP (ref 12–78)
ANION GAP SERPL CALC-SCNC: 10 MMOL/L — SIGNIFICANT CHANGE UP (ref 5–17)
AST SERPL-CCNC: 21 U/L — SIGNIFICANT CHANGE UP (ref 15–37)
BILIRUB SERPL-MCNC: 1 MG/DL — SIGNIFICANT CHANGE UP (ref 0.2–1.2)
BUN SERPL-MCNC: 15 MG/DL — SIGNIFICANT CHANGE UP (ref 7–23)
CALCIUM SERPL-MCNC: 7.8 MG/DL — LOW (ref 8.5–10.1)
CHLORIDE SERPL-SCNC: 105 MMOL/L — SIGNIFICANT CHANGE UP (ref 96–108)
CO2 SERPL-SCNC: 22 MMOL/L — SIGNIFICANT CHANGE UP (ref 22–31)
CREAT SERPL-MCNC: 0.57 MG/DL — SIGNIFICANT CHANGE UP (ref 0.5–1.3)
GLUCOSE SERPL-MCNC: 64 MG/DL — LOW (ref 70–99)
HCT VFR BLD CALC: 36.6 % — LOW (ref 39–50)
HGB BLD-MCNC: 11.5 G/DL — LOW (ref 13–17)
MCHC RBC-ENTMCNC: 24.2 PG — LOW (ref 27–34)
MCHC RBC-ENTMCNC: 31.5 GM/DL — LOW (ref 32–36)
MCV RBC AUTO: 77.1 FL — LOW (ref 80–100)
PLATELET # BLD AUTO: 252 K/UL — SIGNIFICANT CHANGE UP (ref 150–400)
POTASSIUM SERPL-MCNC: 3.3 MMOL/L — LOW (ref 3.5–5.3)
POTASSIUM SERPL-SCNC: 3.3 MMOL/L — LOW (ref 3.5–5.3)
PROT SERPL-MCNC: 6.4 G/DL — SIGNIFICANT CHANGE UP (ref 6–8.3)
RBC # BLD: 4.75 M/UL — SIGNIFICANT CHANGE UP (ref 4.2–5.8)
RBC # FLD: 16 % — HIGH (ref 10.3–14.5)
SODIUM SERPL-SCNC: 137 MMOL/L — SIGNIFICANT CHANGE UP (ref 135–145)
WBC # BLD: 11.6 K/UL — HIGH (ref 3.8–10.5)
WBC # FLD AUTO: 11.6 K/UL — HIGH (ref 3.8–10.5)

## 2017-06-28 RX ORDER — POTASSIUM CHLORIDE 20 MEQ
10 PACKET (EA) ORAL ONCE
Qty: 0 | Refills: 0 | Status: COMPLETED | OUTPATIENT
Start: 2017-06-28 | End: 2017-06-28

## 2017-06-28 RX ADMIN — SODIUM CHLORIDE 75 MILLILITER(S): 9 INJECTION INTRAMUSCULAR; INTRAVENOUS; SUBCUTANEOUS at 11:42

## 2017-06-28 RX ADMIN — ENOXAPARIN SODIUM 40 MILLIGRAM(S): 100 INJECTION SUBCUTANEOUS at 20:37

## 2017-06-28 RX ADMIN — Medication 300 MILLIGRAM(S): at 11:42

## 2017-06-28 RX ADMIN — Medication 100 MILLIEQUIVALENT(S): at 20:34

## 2017-06-28 NOTE — CONSULT NOTE ADULT - PROBLEM SELECTOR RECOMMENDATION 2
Speech and swallow  aspiration precaution  PPI bid  Carafate suspension  Plan for EGD/peg vs ngt pending s/s evaluation

## 2017-06-28 NOTE — PROGRESS NOTE ADULT - SUBJECTIVE AND OBJECTIVE BOX
INTERVAL HPI/OVERNIGHT EVENTS:  HPI:  History obtained through the charts    89 yo male who presents to the ED with a co of slurred speech.  Pt does suffer from dementia but is normally alert to person and place.  He is a fall risk but is ambulatory with a walker and assistance.  Per report pt was in his normal state of health yesterday and even when went for a walk.  He went to sleep around 10:30 pm.  Aide reports that at 5 am she went to check on him because he was snoring loudly.  She noted his mouth open with the facial droop at that time but did not think much.  Normally at this time pt is awake and trying to get out of bed but he remained sleeping and she thought he was tired.  She checked again at 6 am and he was still sleeping.  At a little after 8 am she woke the pt up and noted that his sleep was slurred and that he was not moving his left arm and could not sit up.  She called the pt family who came to check on him and then EMS was called.  Family denies history of previous CVA.  Son dose report that in February pt fell off a chair and suffered a C2 fracture.  The cervical collar was recently removed after several months of treatment.  Aide reports that last Monday pt did strike his head against the door as he was trying to leave the house.  Family further reports that he had 5 teeth removed under local on Monday but had been recovering well. No new overnight event.  No N/V/D.     MEDICATIONS  (STANDING):  enoxaparin Injectable 40 milliGRAM(s) SubCutaneous every 24 hours  sodium chloride 0.9%. 1000 milliLiter(s) (75 mL/Hr) IV Continuous <Continuous>  aspirin Suppository 300 milliGRAM(s) Rectal daily  atorvastatin 20 milliGRAM(s) Oral at bedtime    MEDICATIONS  (PRN):  acetaminophen   Tablet 650 milliGRAM(s) Oral every 6 hours PRN For Temp greater than 38 C (100.4 F)  acetaminophen   Tablet. 650 milliGRAM(s) Oral every 6 hours PRN Mild Pain (1 - 3)  ondansetron Injectable 4 milliGRAM(s) IV Push every 6 hours PRN Nausea      Allergies    No Known Allergies    Intolerances          General:  No wt loss, fevers, chills, night sweats, fatigue,   Eyes:  Good vision, no reported pain  ENT:  No sore throat, pain, runny nose, dysphagia  CV:  No pain, palpitations, hypo/hypertension  Resp:  No dyspnea, cough, tachypnea, wheezing  GI:  No pain, No nausea, No vomiting, No diarrhea, No constipation, No weight loss, No fever, No pruritis, No rectal bleeding, No tarry stools, No dysphagia,  :  No pain, bleeding, incontinence, nocturia  Muscle:  No pain, weakness  Neuro:  No weakness, tingling, memory problems  Psych:  No fatigue, insomnia, mood problems, depression  Endocrine:  No polyuria, polydipsia, cold/heat intolerance  Heme:  No petechiae, ecchymosis, easy bruisability  Skin:  No rash, tattoos, scars, edema      PHYSICAL EXAM:   Vital Signs:  Vital Signs Last 24 Hrs  T(C): 36.4 (2017 15:38), Max: 37.4 (2017 19:56)  T(F): 97.6 (2017 15:38), Max: 99.3 (2017 19:56)  HR: 72 (2017 15:38) (70 - 83)  BP: 167/77 (2017 15:38) (167/77 - 180/68)  BP(mean): --  RR: 19 (2017 15:38) (18 - 20)  SpO2: 97% (2017 15:38) (96% - 97%)  Daily     Daily Weight in k.7 (2017 04:50)I&O's Summary    28 Ju07:  -  2017 16:59  --------------------------------------------------------  IN: 750 mL / OUT: 0 mL / NET: 750 mL        GENERAL:  Appears stated age, well-groomed, well-nourished, no distress  HEENT:  NC/AT,  conjunctivae clear and pink, no thyromegaly, nodules, adenopathy, no JVD, sclera -anicteric  CHEST:  Full & symmetric excursion, no increased effort, breath sounds clear  HEART:  Regular rhythm, S1, S2, no murmur/rub/S3/S4, no abdominal bruit, no edema  ABDOMEN:  Soft, non-tender, non-distended, normoactive bowel sounds,  no masses ,no hepato-splenomegaly, no signs of chronic liver disease  EXTEREMITIES:  no cyanosis,clubbing or edema  SKIN:  No rash/erythema/ecchymoses/petechiae/wounds/abscess/warm/dry  NEURO:  Alert, oriented, no asterixis, no tremor, no encephalopathy      LABS:                        11.5   11.6  )-----------( 252      ( 2017 07:28 )             36.6         137  |  105  |  15  ----------------------------<  64<L>  3.3<L>   |  22  |  0.57    Ca    7.8<L>      2017 07:28    TPro  6.4  /  Alb  2.9<L>  /  TBili  1.0  /  DBili  x   /  AST  21  /  ALT  15  /  AlkPhos  129<H>          amylaseAmylase, Serum Total: 72 U/L ( @ 09:55)     lipaseLipase, Serum: 138 U/L ( @ 09:55)    RADIOLOGY & ADDITIONAL TESTS:

## 2017-06-28 NOTE — CONSULT NOTE ADULT - SUBJECTIVE AND OBJECTIVE BOX
Chief Complaint:  Patient is a 89y old  Male who presents with a chief complaint of sluerred speech (2017 11:31)    89 yo male who presents to the ED with a co of slurred speech.  Pt does suffer from dementia but is normally alert to person and place.  He is a fall risk but is ambulatory with a walker and assistance.  Per report pt was in his normal state of health yesterday and even when went for a walk.  He went to sleep around 10:30 pm.  Aide reports that at 5 am she went to check on him because he was snoring loudly.  She noted his mouth open with the facial droop at that time but did not think much.  Normally at this time pt is awake and trying to get out of bed but he remained sleeping and she thought he was tired.  She checked again at 6 am and he was still sleeping.  At a little after 8 am she woke the pt up and noted that his sleep was slurred and that he was not moving his left arm and could not sit up.  She called the pt family who came to check on him and then EMS was called.  Family denies history of previous CVA.  Son dose report that in February pt fell off a chair and suffered a C2 fracture.  The cervical collar was recently removed after several months of treatment.  Aide reports that last Monday pt did strike his head against the door as he was trying to leave the house.  Family further reports that he had 5 teeth removed under local on Monday but had been recovering well        Allergies:  No Known Allergies      Medications:  enoxaparin Injectable 40 milliGRAM(s) SubCutaneous every 24 hours  acetaminophen   Tablet 650 milliGRAM(s) Oral every 6 hours PRN  acetaminophen   Tablet. 650 milliGRAM(s) Oral every 6 hours PRN  ondansetron Injectable 4 milliGRAM(s) IV Push every 6 hours PRN  sodium chloride 0.9%. 1000 milliLiter(s) IV Continuous <Continuous>  aspirin Suppository 300 milliGRAM(s) Rectal daily  atorvastatin 20 milliGRAM(s) Oral at bedtime      PMHX/PSHX:  CAD (coronary artery disease)  H/O coronary artery bypass surgery  Dementia  S/P CABG x 3      Family history:  No pertinent family history in first degree relatives      Social History: no etoh no cigs no ivda    ROS:     General:  No wt loss, fevers, chills, night sweats, fatigue,   Eyes:  Good vision, no reported pain  ENT:  No sore throat, pain, runny nose, dysphagia  CV:  No pain, palpitations, hypo/hypertension  Resp:  No dyspnea, cough, tachypnea, wheezing  GI:  No pain, No nausea, No vomiting, No diarrhea, No constipation, No weight loss, No fever, No pruritis, No rectal bleeding, No tarry stools, No dysphagia,  :  No pain, bleeding, incontinence, nocturia  Muscle:  No pain, weakness  Neuro:  No weakness, tingling, memory problems  Psych:  No fatigue, insomnia, mood problems, depression  Endocrine:  No polyuria, polydipsia, cold/heat intolerance  Heme:  No petechiae, ecchymosis, easy bruisability  Skin:  No rash, tattoos, scars, edema      PHYSICAL EXAM:   Vital Signs:  Vital Signs Last 24 Hrs  T(C): 37.4 (2017 19:56), Max: 37.6 (2017 15:31)  T(F): 99.3 (2017 19:56), Max: 99.7 (2017 15:31)  HR: 81 (2017 19:56) (67 - 81)  BP: 177/78 (2017 19:56) (167/69 - 188/81)  BP(mean): --  RR: 18 (2017 19:56) (18 - 19)  SpO2: 96% (2017 19:56) (92% - 97%)  Daily     Daily Weight in k.5 (2017 14:59)    GENERAL:  Appears stated age, well-groomed, well-nourished, no distress  HEENT:  NC/AT,  conjunctivae clear and pink, no thyromegaly, nodules, adenopathy, no JVD, sclera -anicteric  CHEST:  Full & symmetric excursion, no increased effort, breath sounds clear  HEART:  Regular rhythm, S1, S2, no murmur/rub/S3/S4, no abdominal bruit, no edema  ABDOMEN:  Soft, non-tender, non-distended, normoactive bowel sounds,  no masses ,no hepato-splenomegaly, no signs of chronic liver disease  EXTEREMITIES:  no cyanosis,clubbing or edema  SKIN:  No rash/erythema/ecchymoses/petechiae/wounds/abscess/warm/dry  NEURO:  Alert, oriented, no asterixis, no tremor, no encephalopathy    LABS:                        11.4   12.9  )-----------( 257      ( 2017 07:03 )             36.8         140  |  107  |  17  ----------------------------<  68<L>  3.6   |  23  |  0.67    Ca    8.1<L>      2017 07:03    TPro  6.3  /  Alb  2.9<L>  /  TBili  1.1  /  DBili  x   /  AST  24  /  ALT  18  /  AlkPhos  129<H>      LIVER FUNCTIONS - ( 2017 07:03 )  Alb: 2.9 g/dL / Pro: 6.3 g/dL / ALK PHOS: 129 U/L / ALT: 18 U/L / AST: 24 U/L / GGT: x                   Imaging:

## 2017-06-28 NOTE — PROGRESS NOTE ADULT - SUBJECTIVE AND OBJECTIVE BOX
[facility] Cardiology    CHIEF COMPLAINT: Patient is a 89y old  Male who presents with a chief complaint of sluerred speech (26 Jun 2017 11:31)      Follow Up: [ ] Chest Pain      [ ] Dyspnea     [ ] Palpitations    [ ] Atrial Fibrillation     [ ] Ventricular Dysrhythmia    [ ] Abnormal EKG                      [ ] Abnormal Cardiac Enzymes     [ ] Valvular Disease    HPI:  History obtained through the charts    88 yo male who presents to the ED with a co of slurred speech.  Pt does suffer from dementia but is normally alert to person and place.  He is a fall risk but is ambulatory with a walker and assistance.  Per report pt was in his normal state of health yesterday and even when went for a walk.  He went to sleep around 10:30 pm.  Aide reports that at 5 am she went to check on him because he was snoring loudly.  She noted his mouth open with the facial droop at that time but did not think much.  Normally at this time pt is awake and trying to get out of bed but he remained sleeping and she thought he was tired.  She checked again at 6 am and he was still sleeping.  At a little after 8 am she woke the pt up and noted that his sleep was slurred and that he was not moving his left arm and could not sit up.  She called the pt family who came to check on him and then EMS was called.  Family denies history of previous CVA.  Son dose report that in February pt fell off a chair and suffered a C2 fracture.  The cervical collar was recently removed after several months of treatment.  Aide reports that last Monday pt did strike his head against the door as he was trying to leave the house.  Family further reports that he had 5 teeth removed under local on Monday but had been recovering well. (25 Jun 2017 13:43)    The patient is examined today, poorly responsive,slurred speech.      PAST MEDICAL & SURGICAL HISTORY:  CAD (coronary artery disease)  Dementia  S/P CABG x 3      MEDICATIONS  (STANDING):  enoxaparin Injectable 40 milliGRAM(s) SubCutaneous every 24 hours  sodium chloride 0.9%. 1000 milliLiter(s) (75 mL/Hr) IV Continuous <Continuous>  aspirin Suppository 300 milliGRAM(s) Rectal daily  atorvastatin 20 milliGRAM(s) Oral at bedtime    MEDICATIONS  (PRN):  acetaminophen   Tablet 650 milliGRAM(s) Oral every 6 hours PRN For Temp greater than 38 C (100.4 F)  acetaminophen   Tablet. 650 milliGRAM(s) Oral every 6 hours PRN Mild Pain (1 - 3)  ondansetron Injectable 4 milliGRAM(s) IV Push every 6 hours PRN Nausea      Allergies    No Known Allergies    Intolerances        REVIEW OF SYSTEMS:    CONSTITUTIONAL: No weakness, fevers or chills.   EYES/ENT: No visual changes;  No vertigo or throat pain   NECK: No pain or stiffness  RESPIRATORY: No cough, wheezing, hemoptysis; No shortness of breath  CARDIOVASCULAR: No chest pain or palpitations  GASTROINTESTINAL: No abdominal or epigastric pain. No nausea, vomiting, or hematemesis; No diarrhea or constipation. No melena or hematochezia.  GENITOURINARY: No dysuria, frequency or hematuria  NEUROLOGICAL: No numbness or weakness  SKIN: No itching, burning, rashes, or lesions   All other review of systems is negative unless indicated above    Vital Signs Last 24 Hrs  T(C): 36.8 (28 Jun 2017 07:10), Max: 37.6 (27 Jun 2017 15:31)  T(F): 98.2 (28 Jun 2017 07:10), Max: 99.7 (27 Jun 2017 15:31)  HR: 70 (28 Jun 2017 07:10) (70 - 81)  BP: 174/76 (28 Jun 2017 07:10) (174/76 - 188/81)  BP(mean): --  RR: 18 (28 Jun 2017 07:10) (18 - 20)  SpO2: 96% (28 Jun 2017 07:10) (92% - 97%)    I&O's Summary      PHYSICAL EXAM:    Constitutional: NAD  Neurological: tired  HEENT: EOMI, no JVD  Cardiovascular: S1 and S2, reg 2/6 sys murmur  Pulmonary: breath sounds bilaterally clear  Gastrointestinal: Bowel Sounds present, soft, nontender  Ext: peripheral edema, none  Skin: No rashes, No cyanosis.  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  Start coreg 3.125 mg bid if ok with neuro.  eventual ACEi given EF <40%   BP targets per neuro  On tele  Overall poor prognosis

## 2017-06-28 NOTE — PROGRESS NOTE ADULT - SUBJECTIVE AND OBJECTIVE BOX
Neurology follow up note  COVERING DR HU [id number]      Interval History:    Patient  appears more awake today     MEDICATIONS    enoxaparin Injectable 40 milliGRAM(s) SubCutaneous every 24 hours  acetaminophen   Tablet 650 milliGRAM(s) Oral every 6 hours PRN  acetaminophen   Tablet. 650 milliGRAM(s) Oral every 6 hours PRN  ondansetron Injectable 4 milliGRAM(s) IV Push every 6 hours PRN  sodium chloride 0.9%. 1000 milliLiter(s) IV Continuous <Continuous>  aspirin Suppository 300 milliGRAM(s) Rectal daily  atorvastatin 20 milliGRAM(s) Oral at bedtime      Allergies    No Known Allergies    Intolerances            Vital Signs Last 24 Hrs  T(C): 36.8 (28 Jun 2017 07:10), Max: 37.6 (27 Jun 2017 15:31)  T(F): 98.2 (28 Jun 2017 07:10), Max: 99.7 (27 Jun 2017 15:31)  HR: 70 (28 Jun 2017 07:10) (70 - 81)  BP: 174/76 (28 Jun 2017 07:10) (174/76 - 188/81)  BP(mean): --  RR: 18 (28 Jun 2017 07:10) (18 - 20)  SpO2: 96% (28 Jun 2017 07:10) (96% - 97%)      REVIEW OF SYSTEMS: limited     On Neurological Examination:    Mental Status - Patient is alert, awake,       Does not follow commands    Speech -     Dysarthria                   Cranial Nerves - Pupils 3 mm equal and reactive to light,   extraocular eye movements intact.   smile symmetric  intact bilateral NLF    Motor Exam -   Right upper 4/5  Left upper 2/5  Right lower 3/5  Left lower 3/5    Muscle tone - is normal all over.  No asymmetry is seen.      Sensory    Bilateral intact to light touch    GENERAL Exam: Nontoxic , No Acute Distress   	  HEENT:  normocephalic, atraumatic  		  LUNGS:  Decreased bilaterally  	  HEART: Normal S1S2   No murmur RRR        	  GI/ ABDOMEN:  Soft  Non tender    EXTREMITIES:   No Edema  No Clubbing  No Cyanosis No Edema  	   SKIN: Normal  No Ecchymosis          LABS:  CBC Full  -  ( 28 Jun 2017 07:28 )  WBC Count : 11.6 K/uL  Hemoglobin : 11.5 g/dL  Hematocrit : 36.6 %  Platelet Count - Automated : 252 K/uL  Mean Cell Volume : 77.1 fl  Mean Cell Hemoglobin : 24.2 pg  Mean Cell Hemoglobin Concentration : 31.5 gm/dL  Auto Neutrophil # : x  Auto Lymphocyte # : x  Auto Monocyte # : x  Auto Eosinophil # : x  Auto Basophil # : x  Auto Neutrophil % : x  Auto Lymphocyte % : x  Auto Monocyte % : x  Auto Eosinophil % : x  Auto Basophil % : x      06-28    137  |  105  |  15  ----------------------------<  64<L>  3.3<L>   |  22  |  0.57    Ca    7.8<L>      28 Jun 2017 07:28    TPro  6.4  /  Alb  2.9<L>  /  TBili  1.0  /  DBili  x   /  AST  21  /  ALT  15  /  AlkPhos  129<H>  06-28    Hemoglobin A1C:     LIVER FUNCTIONS - ( 28 Jun 2017 07:28 )  Alb: 2.9 g/dL / Pro: 6.4 g/dL / ALK PHOS: 129 U/L / ALT: 15 U/L / AST: 21 U/L / GGT: x           Vitamin B12     IMPRESSION:  Slurring of speech and left-sided weakness, consistent with right middle cerebral artery territory ischemia.     What could be calculated from NIH 10 stroke scale would be .    I would recommend telemetry evaluation to rule out underlying arrhythmia..   ct head -CVA  aspirin per  rectum   I will recommend cholesterol medications.  speech and swallow-- failed --- more awake today recommand  ok to control SBP   I will continue to followup.  left message for meir martin  6/28/17      Physical therapy evaluation.    30 minutes spent on total encounter; more than 50% of the visit was spent counseling and/or coordinating care by the attending physician.         RADIOLOGY

## 2017-06-29 LAB
ALBUMIN SERPL ELPH-MCNC: 2.9 G/DL — LOW (ref 3.3–5)
ALP SERPL-CCNC: 126 U/L — HIGH (ref 40–120)
ALT FLD-CCNC: 16 U/L — SIGNIFICANT CHANGE UP (ref 12–78)
ANION GAP SERPL CALC-SCNC: 13 MMOL/L — SIGNIFICANT CHANGE UP (ref 5–17)
AST SERPL-CCNC: 25 U/L — SIGNIFICANT CHANGE UP (ref 15–37)
BILIRUB SERPL-MCNC: 1 MG/DL — SIGNIFICANT CHANGE UP (ref 0.2–1.2)
BUN SERPL-MCNC: 15 MG/DL — SIGNIFICANT CHANGE UP (ref 7–23)
CALCIUM SERPL-MCNC: 7.9 MG/DL — LOW (ref 8.5–10.1)
CHLORIDE SERPL-SCNC: 106 MMOL/L — SIGNIFICANT CHANGE UP (ref 96–108)
CO2 SERPL-SCNC: 20 MMOL/L — LOW (ref 22–31)
CREAT SERPL-MCNC: 0.59 MG/DL — SIGNIFICANT CHANGE UP (ref 0.5–1.3)
GLUCOSE SERPL-MCNC: 61 MG/DL — LOW (ref 70–99)
HCT VFR BLD CALC: 36 % — LOW (ref 39–50)
HGB BLD-MCNC: 11.4 G/DL — LOW (ref 13–17)
MCHC RBC-ENTMCNC: 24.4 PG — LOW (ref 27–34)
MCHC RBC-ENTMCNC: 31.6 GM/DL — LOW (ref 32–36)
MCV RBC AUTO: 77.2 FL — LOW (ref 80–100)
PLATELET # BLD AUTO: 262 K/UL — SIGNIFICANT CHANGE UP (ref 150–400)
POTASSIUM SERPL-MCNC: 3.3 MMOL/L — LOW (ref 3.5–5.3)
POTASSIUM SERPL-SCNC: 3.3 MMOL/L — LOW (ref 3.5–5.3)
PROT SERPL-MCNC: 6.5 G/DL — SIGNIFICANT CHANGE UP (ref 6–8.3)
RBC # BLD: 4.67 M/UL — SIGNIFICANT CHANGE UP (ref 4.2–5.8)
RBC # FLD: 16.8 % — HIGH (ref 10.3–14.5)
SODIUM SERPL-SCNC: 139 MMOL/L — SIGNIFICANT CHANGE UP (ref 135–145)
WBC # BLD: 11.8 K/UL — HIGH (ref 3.8–10.5)
WBC # FLD AUTO: 11.8 K/UL — HIGH (ref 3.8–10.5)

## 2017-06-29 RX ORDER — POTASSIUM CHLORIDE 20 MEQ
10 PACKET (EA) ORAL
Qty: 0 | Refills: 0 | Status: COMPLETED | OUTPATIENT
Start: 2017-06-29 | End: 2017-06-29

## 2017-06-29 RX ADMIN — Medication 100 MILLIEQUIVALENT(S): at 09:33

## 2017-06-29 RX ADMIN — Medication 100 MILLIEQUIVALENT(S): at 10:29

## 2017-06-29 RX ADMIN — Medication 300 MILLIGRAM(S): at 10:29

## 2017-06-29 RX ADMIN — ENOXAPARIN SODIUM 40 MILLIGRAM(S): 100 INJECTION SUBCUTANEOUS at 21:25

## 2017-06-29 RX ADMIN — ATORVASTATIN CALCIUM 20 MILLIGRAM(S): 80 TABLET, FILM COATED ORAL at 21:25

## 2017-06-29 RX ADMIN — SODIUM CHLORIDE 75 MILLILITER(S): 9 INJECTION INTRAMUSCULAR; INTRAVENOUS; SUBCUTANEOUS at 17:04

## 2017-06-29 NOTE — PROGRESS NOTE ADULT - SUBJECTIVE AND OBJECTIVE BOX
Patient is a 89y old  Male who presents with a chief complaint of sluerred speech (26 Jun 2017 11:31)  NAD      INTERVAL HPI/OVERNIGHT EVENTS:  T(C): 37.4 (06-29-17 @ 07:37), Max: 37.6 (06-29-17 @ 05:00)  HR: 79 (06-29-17 @ 07:37) (71 - 83)  BP: 127/61 (06-29-17 @ 07:37) (127/61 - 177/71)  RR: 18 (06-29-17 @ 07:37) (18 - 20)  SpO2: 96% (06-29-17 @ 07:37) (96% - 98%)  Wt(kg): --  I&O's Summary    28 Jun 2017 07:01  -  29 Jun 2017 07:00  --------------------------------------------------------  IN: 750 mL / OUT: 0 mL / NET: 750 mL        LABS:                        11.4   11.8  )-----------( 262      ( 29 Jun 2017 06:43 )             36.0     06-29    139  |  106  |  15  ----------------------------<  61<L>  3.3<L>   |  20<L>  |  0.59    Ca    7.9<L>      29 Jun 2017 06:43    TPro  6.5  /  Alb  2.9<L>  /  TBili  1.0  /  DBili  x   /  AST  25  /  ALT  16  /  AlkPhos  126<H>  06-29        CAPILLARY BLOOD GLUCOSE                MEDICATIONS  (STANDING):  enoxaparin Injectable 40 milliGRAM(s) SubCutaneous every 24 hours  sodium chloride 0.9%. 1000 milliLiter(s) (75 mL/Hr) IV Continuous <Continuous>  aspirin Suppository 300 milliGRAM(s) Rectal daily  atorvastatin 20 milliGRAM(s) Oral at bedtime  potassium chloride  10 mEq/100 mL IVPB 10 milliEquivalent(s) IV Intermittent every 1 hour    MEDICATIONS  (PRN):  acetaminophen   Tablet 650 milliGRAM(s) Oral every 6 hours PRN For Temp greater than 38 C (100.4 F)  acetaminophen   Tablet. 650 milliGRAM(s) Oral every 6 hours PRN Mild Pain (1 - 3)  ondansetron Injectable 4 milliGRAM(s) IV Push every 6 hours PRN Nausea          PHYSICAL EXAM:  GENERAL: NAD, well-groomed, well-developed  HEAD:  Atraumatic, Normocephalic  CHEST/LUNG: Clear to percussion bilaterally; No rales, rhonchi, wheezing, or rubs  HEART: Regular rate and rhythm; No murmurs, rubs, or gallops  ABDOMEN: Soft, Nontender, Nondistended; Bowel sounds present  EXTREMITIES:  2+ Peripheral Pulses, No clubbing, cyanosis, or edema  LYMPH: No lymphadenopathy noted  SKIN: No rashes or lesions    Care Discussed with Consultants/Other Providers [ *] YES  [ ] NO

## 2017-06-29 NOTE — PROGRESS NOTE ADULT - SUBJECTIVE AND OBJECTIVE BOX
INTERVAL HPI/OVERNIGHT EVENTS:  HPI:  History obtained through the charts    89 yo male who presents to the ED with a co of slurred speech.  Pt does suffer from dementia but is normally alert to person and place.  He is a fall risk but is ambulatory with a walker and assistance.  Per report pt was in his normal state of health yesterday and even when went for a walk.  He went to sleep around 10:30 pm.  Aide reports that at 5 am she went to check on him because he was snoring loudly.  She noted his mouth open with the facial droop at that time but did not think much.  Normally at this time pt is awake and trying to get out of bed but he remained sleeping and she thought he was tired.  She checked again at 6 am and he was still sleeping.  At a little after 8 am she woke the pt up and noted that his sleep was slurred and that he was not moving his left arm and could not sit up.  She called the pt family who came to check on him and then EMS was called.  Family denies history of previous CVA.  Son dose report that in February pt fell off a chair and suffered a C2 fracture.  The cervical collar was recently removed after several months of treatment.  Aide reports that last Monday pt did strike his head against the door as he was trying to leave the house.  Family further reports that he had 5 teeth removed under local on Monday but had been recovering well.   lethargic npo  MEDICATIONS  (STANDING):  enoxaparin Injectable 40 milliGRAM(s) SubCutaneous every 24 hours  sodium chloride 0.9%. 1000 milliLiter(s) (75 mL/Hr) IV Continuous <Continuous>  aspirin Suppository 300 milliGRAM(s) Rectal daily  atorvastatin 20 milliGRAM(s) Oral at bedtime  potassium chloride  10 mEq/100 mL IVPB 10 milliEquivalent(s) IV Intermittent every 1 hour    MEDICATIONS  (PRN):  acetaminophen   Tablet 650 milliGRAM(s) Oral every 6 hours PRN For Temp greater than 38 C (100.4 F)  acetaminophen   Tablet. 650 milliGRAM(s) Oral every 6 hours PRN Mild Pain (1 - 3)  ondansetron Injectable 4 milliGRAM(s) IV Push every 6 hours PRN Nausea      Allergies    No Known Allergies    Intolerances          General:  No wt loss, fevers, chills, night sweats, fatigue,   Eyes:  Good vision, no reported pain  ENT:  No sore throat, pain, runny nose, dysphagia  CV:  No pain, palpitations, hypo/hypertension  Resp:  No dyspnea, cough, tachypnea, wheezing  GI:  No pain, No nausea, No vomiting, No diarrhea, No constipation, No weight loss, No fever, No pruritis, No rectal bleeding, No tarry stools, No dysphagia,  :  No pain, bleeding, incontinence, nocturia  Muscle:  No pain, weakness  Neuro:  No weakness, tingling, memory problems  Psych:  No fatigue, insomnia, mood problems, depression  Endocrine:  No polyuria, polydipsia, cold/heat intolerance  Heme:  No petechiae, ecchymosis, easy bruisability  Skin:  No rash, tattoos, scars, edema      PHYSICAL EXAM:   Vital Signs:  Vital Signs Last 24 Hrs  T(C): 37.4 (2017 07:37), Max: 37.6 (2017 05:00)  T(F): 99.3 (2017 07:37), Max: 99.7 (2017 05:00)  HR: 79 (2017 07:37) (71 - 83)  BP: 127/61 (2017 07:37) (127/61 - 177/71)  BP(mean): --  RR: 18 (2017 07:37) (18 - 20)  SpO2: 96% (2017 07:37) (96% - 98%)  Daily     Daily Weight in k.3 (2017 05:00)I&O's Summary      -  2017 07:00  --------------------------------------------------------  IN: 750 mL / OUT: 0 mL / NET: 750 mL    :  -  2017 10:22  --------------------------------------------------------  IN: 325 mL / OUT: 0 mL / NET: 325 mL        GENERAL:  Appears stated age, well-groomed, well-nourished, no distress  HEENT:  NC/AT,  conjunctivae clear and pink, no thyromegaly, nodules, adenopathy, no JVD, sclera -anicteric  CHEST:  Full & symmetric excursion, no increased effort, breath sounds clear  HEART:  Regular rhythm, S1, S2, no murmur/rub/S3/S4, no abdominal bruit, no edema  ABDOMEN:  Soft, non-tender, non-distended, normoactive bowel sounds,  no masses ,no hepato-splenomegaly, no signs of chronic liver disease  EXTEREMITIES:  no cyanosis,clubbing or edema  SKIN:  No rash/erythema/ecchymoses/petechiae/wounds/abscess/warm/dry  NEURO:  Alert, oriented x1, no asterixis, no tremor, no encephalopathy      LABS:                        11.4   11.8  )-----------( 262      ( 2017 06:43 )             36.0         139  |  106  |  15  ----------------------------<  61<L>  3.3<L>   |  20<L>  |  0.59    Ca    7.9<L>      2017 06:43    TPro  6.5  /  Alb  2.9<L>  /  TBili  1.0  /  DBili  x   /  AST  25  /  ALT  16  /  AlkPhos  126<H>          amylaseAmylase, Serum Total: 72 U/L ( @ 09:55)     lipaseLipase, Serum: 138 U/L ( @ 09:55)    RADIOLOGY & ADDITIONAL TESTS:

## 2017-06-29 NOTE — PROGRESS NOTE ADULT - ASSESSMENT
Problem/Plan - 1:  ·  Problem: Slurred speech.  Plan: ro sroke  neuro fu  cw asa  speech and swallow evaluation appreciated, still NPO  PT consult  NPO for now.       Problem/Plan - 2:  ·  Problem: S/P CABG x 3.  Plan: cards called  will monitor.   cw asa suppository    Palliative fu for GOC

## 2017-06-29 NOTE — PROGRESS NOTE ADULT - SUBJECTIVE AND OBJECTIVE BOX
Neurology follow up note  COVERING DR HU ARANA89yMale      Interval History:    Patient resting in bed     MEDICATIONS    enoxaparin Injectable 40 milliGRAM(s) SubCutaneous every 24 hours  acetaminophen   Tablet 650 milliGRAM(s) Oral every 6 hours PRN  acetaminophen   Tablet. 650 milliGRAM(s) Oral every 6 hours PRN  ondansetron Injectable 4 milliGRAM(s) IV Push every 6 hours PRN  sodium chloride 0.9%. 1000 milliLiter(s) IV Continuous <Continuous>  aspirin Suppository 300 milliGRAM(s) Rectal daily  atorvastatin 20 milliGRAM(s) Oral at bedtime      Allergies    No Known Allergies    Intolerances            Vital Signs Last 24 Hrs  T(C): 37.4 (29 Jun 2017 07:37), Max: 37.6 (29 Jun 2017 05:00)  T(F): 99.3 (29 Jun 2017 07:37), Max: 99.7 (29 Jun 2017 05:00)  HR: 79 (29 Jun 2017 07:37) (71 - 83)  BP: 127/61 (29 Jun 2017 07:37) (127/61 - 177/71)  BP(mean): --  RR: 18 (29 Jun 2017 07:37) (18 - 20)  SpO2: 96% (29 Jun 2017 07:37) (96% - 98%)    REVIEW OF SYSTEMS: limited     On Neurological Examination:    Mental Status - Patient is lethargic       Does not follow commands    Speech -     Dysarthria                   Cranial Nerves - Pupils 3 mm equal and reactive to light,   extraocular eye movements intact.   smile symmetric  intact bilateral NLF    Motor Exam -   Right upper 4/5  Left upper 2/5  Right lower 3/5  Left lower 3/5    Muscle tone - is normal all over.  No asymmetry is seen.      Sensory    Bilateral intact to light touch    GENERAL Exam: Nontoxic , No Acute Distress   	  HEENT:  normocephalic, atraumatic  		  LUNGS:  Decreased bilaterally  	  HEART: Normal S1S2   No murmur RRR        	  GI/ ABDOMEN:  Soft  Non tender    EXTREMITIES:   No Edema  No Clubbing  No Cyanosis No Edema  	   SKIN: Normal  No Ecchymosis            LABS:  CBC Full  -  ( 29 Jun 2017 06:43 )  WBC Count : 11.8 K/uL  Hemoglobin : 11.4 g/dL  Hematocrit : 36.0 %  Platelet Count - Automated : 262 K/uL  Mean Cell Volume : 77.2 fl  Mean Cell Hemoglobin : 24.4 pg  Mean Cell Hemoglobin Concentration : 31.6 gm/dL  Auto Neutrophil # : x  Auto Lymphocyte # : x  Auto Monocyte # : x  Auto Eosinophil # : x  Auto Basophil # : x  Auto Neutrophil % : x  Auto Lymphocyte % : x  Auto Monocyte % : x  Auto Eosinophil % : x  Auto Basophil % : x      06-29    139  |  106  |  15  ----------------------------<  61<L>  3.3<L>   |  20<L>  |  0.59    Ca    7.9<L>      29 Jun 2017 06:43    TPro  6.5  /  Alb  2.9<L>  /  TBili  1.0  /  DBili  x   /  AST  25  /  ALT  16  /  AlkPhos  126<H>  06-29    Hemoglobin A1C:     LIVER FUNCTIONS - ( 29 Jun 2017 06:43 )  Alb: 2.9 g/dL / Pro: 6.5 g/dL / ALK PHOS: 126 U/L / ALT: 16 U/L / AST: 25 U/L / GGT: x           Vitamin B12         RADIOLOGY    IMPRESSION:  Slurring of speech and left-sided weakness, consistent with right middle cerebral artery territory ischemia.     What could be calculated from NIH 10 stroke scale would be .    I would recommend telemetry evaluation to rule out underlying arrhythmia..   ct head -CVA  aspirin per  rectum   I will recommend cholesterol medications.  speech and swallow-- failed --- questionable PEG vs supportive treatment   ok to control SBP   I will continue to followup.  spoke to son mario  6/29/17    30 minutes spent on total encounter; more than 50% of the visit was spent counseling and/or coordinating care by the attending physician.

## 2017-06-30 LAB
ALBUMIN SERPL ELPH-MCNC: 2.9 G/DL — LOW (ref 3.3–5)
ALP SERPL-CCNC: 118 U/L — SIGNIFICANT CHANGE UP (ref 40–120)
ALT FLD-CCNC: 19 U/L — SIGNIFICANT CHANGE UP (ref 12–78)
ANION GAP SERPL CALC-SCNC: 10 MMOL/L — SIGNIFICANT CHANGE UP (ref 5–17)
AST SERPL-CCNC: 24 U/L — SIGNIFICANT CHANGE UP (ref 15–37)
BILIRUB SERPL-MCNC: 0.9 MG/DL — SIGNIFICANT CHANGE UP (ref 0.2–1.2)
BUN SERPL-MCNC: 8 MG/DL — SIGNIFICANT CHANGE UP (ref 7–23)
CALCIUM SERPL-MCNC: 8.3 MG/DL — LOW (ref 8.5–10.1)
CHLORIDE SERPL-SCNC: 104 MMOL/L — SIGNIFICANT CHANGE UP (ref 96–108)
CO2 SERPL-SCNC: 25 MMOL/L — SIGNIFICANT CHANGE UP (ref 22–31)
CREAT SERPL-MCNC: 0.51 MG/DL — SIGNIFICANT CHANGE UP (ref 0.5–1.3)
GLUCOSE SERPL-MCNC: 76 MG/DL — SIGNIFICANT CHANGE UP (ref 70–99)
HCT VFR BLD CALC: 36.6 % — LOW (ref 39–50)
HGB BLD-MCNC: 11.4 G/DL — LOW (ref 13–17)
MCHC RBC-ENTMCNC: 24 PG — LOW (ref 27–34)
MCHC RBC-ENTMCNC: 31.2 GM/DL — LOW (ref 32–36)
MCV RBC AUTO: 77 FL — LOW (ref 80–100)
PLATELET # BLD AUTO: 251 K/UL — SIGNIFICANT CHANGE UP (ref 150–400)
POTASSIUM SERPL-MCNC: 3.1 MMOL/L — LOW (ref 3.5–5.3)
POTASSIUM SERPL-SCNC: 3.1 MMOL/L — LOW (ref 3.5–5.3)
PROT SERPL-MCNC: 6.3 G/DL — SIGNIFICANT CHANGE UP (ref 6–8.3)
RBC # BLD: 4.75 M/UL — SIGNIFICANT CHANGE UP (ref 4.2–5.8)
RBC # FLD: 16.5 % — HIGH (ref 10.3–14.5)
SODIUM SERPL-SCNC: 139 MMOL/L — SIGNIFICANT CHANGE UP (ref 135–145)
WBC # BLD: 11.6 K/UL — HIGH (ref 3.8–10.5)
WBC # FLD AUTO: 11.6 K/UL — HIGH (ref 3.8–10.5)

## 2017-06-30 PROCEDURE — 70450 CT HEAD/BRAIN W/O DYE: CPT | Mod: 26

## 2017-06-30 RX ORDER — CARVEDILOL PHOSPHATE 80 MG/1
3.12 CAPSULE, EXTENDED RELEASE ORAL EVERY 12 HOURS
Qty: 0 | Refills: 0 | Status: DISCONTINUED | OUTPATIENT
Start: 2017-06-30 | End: 2017-07-03

## 2017-06-30 RX ORDER — CARVEDILOL PHOSPHATE 80 MG/1
3.12 CAPSULE, EXTENDED RELEASE ORAL EVERY 12 HOURS
Qty: 0 | Refills: 0 | Status: DISCONTINUED | OUTPATIENT
Start: 2017-06-30 | End: 2017-06-30

## 2017-06-30 RX ORDER — CARVEDILOL PHOSPHATE 80 MG/1
3.12 CAPSULE, EXTENDED RELEASE ORAL EVERY 12 HOURS
Qty: 0 | Refills: 0 | Status: DISCONTINUED | OUTPATIENT
Start: 2017-06-30 | End: 2017-07-01

## 2017-06-30 RX ORDER — POTASSIUM CHLORIDE 20 MEQ
10 PACKET (EA) ORAL
Qty: 0 | Refills: 0 | Status: COMPLETED | OUTPATIENT
Start: 2017-06-30 | End: 2017-06-30

## 2017-06-30 RX ORDER — ASPIRIN/CALCIUM CARB/MAGNESIUM 324 MG
81 TABLET ORAL DAILY
Qty: 0 | Refills: 0 | Status: DISCONTINUED | OUTPATIENT
Start: 2017-06-30 | End: 2017-06-30

## 2017-06-30 RX ORDER — ASPIRIN/CALCIUM CARB/MAGNESIUM 324 MG
81 TABLET ORAL DAILY
Qty: 0 | Refills: 0 | Status: DISCONTINUED | OUTPATIENT
Start: 2017-06-30 | End: 2017-07-03

## 2017-06-30 RX ADMIN — ATORVASTATIN CALCIUM 20 MILLIGRAM(S): 80 TABLET, FILM COATED ORAL at 21:47

## 2017-06-30 RX ADMIN — Medication 100 MILLIEQUIVALENT(S): at 08:22

## 2017-06-30 RX ADMIN — Medication 81 MILLIGRAM(S): at 11:39

## 2017-06-30 RX ADMIN — CARVEDILOL PHOSPHATE 3.12 MILLIGRAM(S): 80 CAPSULE, EXTENDED RELEASE ORAL at 08:22

## 2017-06-30 RX ADMIN — SODIUM CHLORIDE 75 MILLILITER(S): 9 INJECTION INTRAMUSCULAR; INTRAVENOUS; SUBCUTANEOUS at 21:47

## 2017-06-30 RX ADMIN — Medication 100 MILLIEQUIVALENT(S): at 09:13

## 2017-06-30 RX ADMIN — ENOXAPARIN SODIUM 40 MILLIGRAM(S): 100 INJECTION SUBCUTANEOUS at 21:47

## 2017-06-30 RX ADMIN — CARVEDILOL PHOSPHATE 3.12 MILLIGRAM(S): 80 CAPSULE, EXTENDED RELEASE ORAL at 21:47

## 2017-06-30 RX ADMIN — Medication 100 MILLIEQUIVALENT(S): at 10:18

## 2017-06-30 NOTE — PROGRESS NOTE ADULT - ASSESSMENT
89 yo male CAD, CABG, dementia, prior EF 38% on [id number] system echo Feb 2017 p/w slurred speech/aphasia, found with Subacute R MCA infarct  F/U neuro eval  ECHO reveals EF 40%, hypokinetic apex/septum, grossly unchanged from baseline, mild to mod AI  Cont ASA, Lipitor 20mg daily.  Start coreg 3.125 mg bid if ok with neuro.  eventual ACEi given EF <40%   BP targets per neuro  On tele  Overall poor prognosis

## 2017-06-30 NOTE — PROGRESS NOTE ADULT - SUBJECTIVE AND OBJECTIVE BOX
[facility] Progress Note [phone] (Dr. Jeong, Kimberly, Darci, Sandhya)    CHIEF COMPLAINT: Patient is a 89y old  Male who presents with a chief complaint of sluerred speech (2017 11:31)      Follow Up Today: The patient denies any chest discomfort or shortness of breath.    HPI:  History obtained through the charts    88 yo male who presents to the ED with a co of slurred speech.  Pt does suffer from dementia but is normally alert to person and place.  He is a fall risk but is ambulatory with a walker and assistance.  Per report pt was in his normal state of health yesterday and even when went for a walk.  He went to sleep around 10:30 pm.  Aide reports that at 5 am she went to check on him because he was snoring loudly.  She noted his mouth open with the facial droop at that time but did not think much.  Normally at this time pt is awake and trying to get out of bed but he remained sleeping and she thought he was tired.  She checked again at 6 am and he was still sleeping.  At a little after 8 am she woke the pt up and noted that his sleep was slurred and that he was not moving his left arm and could not sit up.  She called the pt family who came to check on him and then EMS was called.  Family denies history of previous CVA.  Son dose report that in February pt fell off a chair and suffered a C2 fracture.  The cervical collar was recently removed after several months of treatment.  Aide reports that last Monday pt did strike his head against the door as he was trying to leave the house.  Family further reports that he had 5 teeth removed under local on Monday but had been recovering well. (2017 13:43)      PAST MEDICAL & SURGICAL HISTORY:  CAD (coronary artery disease)  Dementia  S/P CABG x 3      MEDICATIONS  (STANDING):  enoxaparin Injectable 40 milliGRAM(s) SubCutaneous every 24 hours  sodium chloride 0.9%. 1000 milliLiter(s) (75 mL/Hr) IV Continuous <Continuous>  aspirin Suppository 300 milliGRAM(s) Rectal daily  atorvastatin 20 milliGRAM(s) Oral at bedtime    MEDICATIONS  (PRN):  acetaminophen   Tablet 650 milliGRAM(s) Oral every 6 hours PRN For Temp greater than 38 C (100.4 F)  acetaminophen   Tablet. 650 milliGRAM(s) Oral every 6 hours PRN Mild Pain (1 - 3)  ondansetron Injectable 4 milliGRAM(s) IV Push every 6 hours PRN Nausea      Allergies    No Known Allergies    Intolerances        REVIEW OF SYSTEMS:    All other review of systems is negative unless indicated above    Vital Signs Last 24 Hrs  T(C): 37.4 (2017 04:53), Max: 37.4 (2017 07:37)  T(F): 99.3 (2017 04:53), Max: 99.3 (2017 07:37)  HR: 71 (2017 04:53) (69 - 79)  BP: 172/75 (2017 04:53) (127/61 - 178/79)  BP(mean): --  RR: 20 (2017 04:53) (18 - 20)  SpO2: 100% (2017 04:53) (96% - 100%)    I&O's Summary    2017 07:01  -  2017 07:00  --------------------------------------------------------  IN: 750 mL / OUT: 0 mL / NET: 750 mL    2017 07:01  -  2017 06:48  --------------------------------------------------------  IN: 1150 mL / OUT: 0 mL / NET: 1150 mL        PHYSICAL EXAM:    Constitutional: NAD  Neurological: tired  HEENT: EOMI, no JVD  Cardiovascular: S1 and S2, reg 2/6 sys murmur  Pulmonary: breath sounds bilaterally clear  Gastrointestinal: Bowel Sounds present, soft, nontender  Ext: peripheral edema, none  Skin: No rashes, No cyanosis.  Psych:  Mood & affect appropriate     LABS: All Labs Reviewed:                        11.4   11.8  )-----------( 262      ( 2017 06:43 )             36.0                         11.5   11.6  )-----------( 252      ( 2017 07:28 )             36.6                         11.4   12.9  )-----------( 257      ( 2017 07:03 )             36.8     2017 06:43    139    |  106    |  15     ----------------------------<  61     3.3     |  20     |  0.59   2017 07:28    137    |  105    |  15     ----------------------------<  64     3.3     |  22     |  0.57   2017 07:03    140    |  107    |  17     ----------------------------<  68     3.6     |  23     |  0.67     Ca    7.9        2017 06:43  Ca    7.8        2017 07:28  Ca    8.1        2017 07:03    TPro  6.5    /  Alb  2.9    /  TBili  1.0    /  DBili  x      /  AST  25     /  ALT  16     /  AlkPhos  126    2017 06:43  TPro  6.4    /  Alb  2.9    /  TBili  1.0    /  DBili  x      /  AST  21     /  ALT  15     /  AlkPhos  129    2017 07:28  TPro  6.3    /  Alb  2.9    /  TBili  1.1    /  DBili  x      /  AST  24     /  ALT  18     /  AlkPhos  129    2017 07:03          Blood Culture:         RADIOLOGY/EK/26/17 Echo - < from: [facility] Echo Doppler w/o Cont (17 @ 09:11) >  CONCLUSIONS:  Ejection fraction appears 40%, in some views appears 40-45%. Prior   ejection fraction was 38% in 2017. Hypokinesia of the apical and   mid septal segments. Mild to moderate AI, no significant pericardial   effusion    < end of copied text >      Attending Attestation:   20 minutes spent on total encounter; more than 50% of the visit was spent counseling and/or coordinating care by the attending physician.     ASSESSMENT AND PLAN

## 2017-06-30 NOTE — PROGRESS NOTE ADULT - SUBJECTIVE AND OBJECTIVE BOX
INTERVAL HPI/OVERNIGHT EVENTS:  HPI:  History obtained through the charts    89 yo male who presents to the ED with a co of slurred speech.  Pt does suffer from dementia but is normally alert to person and place.  He is a fall risk but is ambulatory with a walker and assistance.  Per report pt was in his normal state of health yesterday and even when went for a walk.  He went to sleep around 10:30 pm.  Aide reports that at 5 am she went to check on him because he was snoring loudly.  She noted his mouth open with the facial droop at that time but did not think much.  Normally at this time pt is awake and trying to get out of bed but he remained sleeping and she thought he was tired.  She checked again at 6 am and he was still sleeping.  At a little after 8 am she woke the pt up and noted that his sleep was slurred and that he was not moving his left arm and could not sit up.  She called the pt family who came to check on him and then EMS was called.  Family denies history of previous CVA.  Son dose report that in February pt fell off a chair and suffered a C2 fracture.  The cervical collar was recently removed after several months of treatment.  Aide reports that last Monday pt did strike his head against the door as he was trying to leave the house.  Family further reports that he had 5 teeth removed under local on Monday but had been recovering well.  No new overnight event.  No N/V/D.  Tolerating diet.    MEDICATIONS  (STANDING):  enoxaparin Injectable 40 milliGRAM(s) SubCutaneous every 24 hours  sodium chloride 0.9%. 1000 milliLiter(s) (75 mL/Hr) IV Continuous <Continuous>  atorvastatin 20 milliGRAM(s) Oral at bedtime  carvedilol 3.125 milliGRAM(s) Oral every 12 hours  aspirin  chewable 81 milliGRAM(s) Oral daily    MEDICATIONS  (PRN):  acetaminophen   Tablet 650 milliGRAM(s) Oral every 6 hours PRN For Temp greater than 38 C (100.4 F)  acetaminophen   Tablet. 650 milliGRAM(s) Oral every 6 hours PRN Mild Pain (1 - 3)  ondansetron Injectable 4 milliGRAM(s) IV Push every 6 hours PRN Nausea      Allergies    No Known Allergies    Intolerances          General:  No wt loss, fevers, chills, night sweats, fatigue,   Eyes:  Good vision, no reported pain  ENT:  No sore throat, pain, runny nose, dysphagia  CV:  No pain, palpitations, hypo/hypertension  Resp:  No dyspnea, cough, tachypnea, wheezing  GI:  No pain, No nausea, No vomiting, No diarrhea, No constipation, No weight loss, No fever, No pruritis, No rectal bleeding, No tarry stools, No dysphagia,  :  No pain, bleeding, incontinence, nocturia  Muscle:  No pain, weakness  Neuro:  No weakness, tingling, memory problems  Psych:  No fatigue, insomnia, mood problems, depression  Endocrine:  No polyuria, polydipsia, cold/heat intolerance  Heme:  No petechiae, ecchymosis, easy bruisability  Skin:  No rash, tattoos, scars, edema      PHYSICAL EXAM:   Vital Signs:  Vital Signs Last 24 Hrs  T(C): 37.3 (2017 12:28), Max: 37.4 (2017 04:53)  T(F): 99.2 (2017 12:28), Max: 99.3 (2017 04:53)  HR: 65 (2017 12:28) (65 - 84)  BP: 137/70 (2017 12:28) (137/70 - 178/79)  BP(mean): --  RR: 18 (2017 12:28) (18 - 20)  SpO2: 98% (2017 12:28) (96% - 100%)  Daily     Daily Weight in k.3 (2017 04:53)I&O's Summary    2017 07  -  2017 07:00  --------------------------------------------------------  IN: 1325 mL / OUT: 0 mL / NET: 1325 mL      -  2017 14:36  --------------------------------------------------------  IN: 750 mL / OUT: 0 mL / NET: 750 mL        GENERAL:  Appears stated age, well-groomed, well-nourished, no distress  HEENT:  NC/AT,  conjunctivae clear and pink, no thyromegaly, nodules, adenopathy, no JVD, sclera -anicteric  CHEST:  Full & symmetric excursion, no increased effort, breath sounds clear  HEART:  Regular rhythm, S1, S2, no murmur/rub/S3/S4, no abdominal bruit, no edema  ABDOMEN:  Soft, non-tender, non-distended, normoactive bowel sounds,  no masses ,no hepato-splenomegaly, no signs of chronic liver disease  EXTEREMITIES:  no cyanosis,clubbing or edema  SKIN:  No rash/erythema/ecchymoses/petechiae/wounds/abscess/warm/dry  NEURO:  Alert, oriented, no asterixis, no tremor, no encephalopathy      LABS:                        11.4   11.6  )-----------( 251      ( 2017 06:32 )             36.6         139  |  104  |  8   ----------------------------<  76  3.1<L>   |  25  |  0.51    Ca    8.3<L>      2017 06:32    TPro  6.3  /  Alb  2.9<L>  /  TBili  0.9  /  DBili  x   /  AST  24  /  ALT  19  /  AlkPhos  118          amylase   lipase  RADIOLOGY & ADDITIONAL TESTS:

## 2017-06-30 NOTE — PROGRESS NOTE ADULT - SUBJECTIVE AND OBJECTIVE BOX
Neurology follow up note COVERING DR HU ARANA89yMale      Interval History:    Patient feels ok     MEDICATIONS    enoxaparin Injectable 40 milliGRAM(s) SubCutaneous every 24 hours  acetaminophen   Tablet 650 milliGRAM(s) Oral every 6 hours PRN  acetaminophen   Tablet. 650 milliGRAM(s) Oral every 6 hours PRN  ondansetron Injectable 4 milliGRAM(s) IV Push every 6 hours PRN  sodium chloride 0.9%. 1000 milliLiter(s) IV Continuous <Continuous>  atorvastatin 20 milliGRAM(s) Oral at bedtime  carvedilol 3.125 milliGRAM(s) Oral every 12 hours  aspirin  chewable 81 milliGRAM(s) Oral daily      Allergies    No Known Allergies    Intolerances            Vital Signs Last 24 Hrs  T(C): 37.3 (30 Jun 2017 12:28), Max: 37.4 (30 Jun 2017 04:53)  T(F): 99.2 (30 Jun 2017 12:28), Max: 99.3 (30 Jun 2017 04:53)  HR: 65 (30 Jun 2017 12:28) (65 - 84)  BP: 137/70 (30 Jun 2017 12:28) (137/70 - 178/79)  BP(mean): --  RR: 18 (30 Jun 2017 12:28) (18 - 20)  SpO2: 98% (30 Jun 2017 12:28) (96% - 100%)      REVIEW OF SYSTEMS: limited     On Neurological Examination:    Mental Status - Patient is awake in bed       Does not follow commands    Speech -     Dysarthria                   Cranial Nerves - Pupils 3 mm equal and reactive to light,   extraocular eye movements intact.   smile symmetric  intact bilateral NLF    Motor Exam -   Right upper 4/5  Left upper 2/5  Right lower 3/5  Left lower 3/5    Muscle tone - is normal all over.  No asymmetry is seen.      Sensory    Bilateral intact to light touch    GENERAL Exam: Nontoxic , No Acute Distress   	  HEENT:  normocephalic, atraumatic  		  LUNGS:  Decreased bilaterally  	  HEART: Normal S1S2   No murmur RRR        	  GI/ ABDOMEN:  Soft  Non tender    EXTREMITIES:   No Edema  No Clubbing  No Cyanosis No Edema  	   SKIN: Normal  No Ecchymosis            LABS:  CBC Full  -  ( 30 Jun 2017 06:32 )  WBC Count : 11.6 K/uL  Hemoglobin : 11.4 g/dL  Hematocrit : 36.6 %  Platelet Count - Automated : 251 K/uL  Mean Cell Volume : 77.0 fl  Mean Cell Hemoglobin : 24.0 pg  Mean Cell Hemoglobin Concentration : 31.2 gm/dL  Auto Neutrophil # : x  Auto Lymphocyte # : x  Auto Monocyte # : x  Auto Eosinophil # : x  Auto Basophil # : x  Auto Neutrophil % : x  Auto Lymphocyte % : x  Auto Monocyte % : x  Auto Eosinophil % : x  Auto Basophil % : x      06-30    139  |  104  |  8   ----------------------------<  76  3.1<L>   |  25  |  0.51    Ca    8.3<L>      30 Jun 2017 06:32    TPro  6.3  /  Alb  2.9<L>  /  TBili  0.9  /  DBili  x   /  AST  24  /  ALT  19  /  AlkPhos  118  06-30    Hemoglobin A1C:     LIVER FUNCTIONS - ( 30 Jun 2017 06:32 )  Alb: 2.9 g/dL / Pro: 6.3 g/dL / ALK PHOS: 118 U/L / ALT: 19 U/L / AST: 24 U/L / GGT: x           Vitamin B12         RADIOLOGY      IMPRESSION:  Slurring of speech and left-sided weakness, consistent with right middle cerebral artery territory ischemia.     What could be calculated from NIH 10 stroke scale would be .    I would recommend telemetry evaluation to rule out underlying arrhythmia..   ct head -CVA  aspirin   I will recommend cholesterol medications.  monitor oral intake did have apple sauce today  ok to control SBP   aspiration precautions   I will continue to followup.  spoke to meir martin  6/30/17    30 minutes spent on total encounter; more than 50% of the visit was spent counseling and/or coordinating care by the attending physician.

## 2017-06-30 NOTE — PROGRESS NOTE ADULT - SUBJECTIVE AND OBJECTIVE BOX
Patient is a 89y old  Male who presents with a chief complaint of sluerred speech (26 Jun 2017 11:31)      INTERVAL HPI/OVERNIGHT EVENTS:  T(C): 37.4 (06-30-17 @ 04:53), Max: 37.4 (06-29-17 @ 07:37)  HR: 71 (06-30-17 @ 04:53) (69 - 79)  BP: 172/75 (06-30-17 @ 04:53) (127/61 - 178/79)  RR: 20 (06-30-17 @ 04:53) (18 - 20)  SpO2: 100% (06-30-17 @ 04:53) (96% - 100%)  Wt(kg): --  I&O's Summary    29 Jun 2017 07:01  -  30 Jun 2017 07:00  --------------------------------------------------------  IN: 1225 mL / OUT: 0 mL / NET: 1225 mL        LABS:                        11.4   11.8  )-----------( 262      ( 29 Jun 2017 06:43 )             36.0     06-30    139  |  104  |  8   ----------------------------<  76  3.1<L>   |  25  |  0.51    Ca    8.3<L>      30 Jun 2017 06:32    TPro  6.3  /  Alb  2.9<L>  /  TBili  0.9  /  DBili  x   /  AST  24  /  ALT  19  /  AlkPhos  118  06-30        CAPILLARY BLOOD GLUCOSE                MEDICATIONS  (STANDING):  enoxaparin Injectable 40 milliGRAM(s) SubCutaneous every 24 hours  sodium chloride 0.9%. 1000 milliLiter(s) (75 mL/Hr) IV Continuous <Continuous>  aspirin Suppository 300 milliGRAM(s) Rectal daily  atorvastatin 20 milliGRAM(s) Oral at bedtime  carvedilol 3.125 milliGRAM(s) Oral every 12 hours  potassium chloride  10 mEq/100 mL IVPB 10 milliEquivalent(s) IV Intermittent every 1 hour    MEDICATIONS  (PRN):  acetaminophen   Tablet 650 milliGRAM(s) Oral every 6 hours PRN For Temp greater than 38 C (100.4 F)  acetaminophen   Tablet. 650 milliGRAM(s) Oral every 6 hours PRN Mild Pain (1 - 3)  ondansetron Injectable 4 milliGRAM(s) IV Push every 6 hours PRN Nausea          PHYSICAL EXAM:  GENERAL: NAD, well-groomed, well-developed  HEAD:  Atraumatic, Normocephalic  CHEST/LUNG: Clear to percussion bilaterally; No rales, rhonchi, wheezing, or rubs  HEART: Regular rate and rhythm; No murmurs, rubs, or gallops  ABDOMEN: Soft, Nontender, Nondistended; Bowel sounds present  EXTREMITIES:  2+ Peripheral Pulses, No clubbing, cyanosis, or edema  LYMPH: No lymphadenopathy noted  SKIN: No rashes or lesions    Care Discussed with Consultants/Other Providers [ *] YES  [ ] NO

## 2017-06-30 NOTE — PROGRESS NOTE ADULT - ASSESSMENT
Problem/Plan - 1:  ·  Problem: Slurred speech.  Plan: ro sroke  neuro fu  cw asa  speech and swallow evaluation appreciated, start dysphagia diet  PT consult      Problem/Plan - 2:  ·  Problem: S/P CABG x 3.  Plan: cards called  will monitor.   cw asa and carvidolol    Palliative fu for GOC, pt slowly improving

## 2017-07-01 LAB
ALBUMIN SERPL ELPH-MCNC: 2.7 G/DL — LOW (ref 3.3–5)
ALP SERPL-CCNC: 106 U/L — SIGNIFICANT CHANGE UP (ref 40–120)
ALT FLD-CCNC: 18 U/L — SIGNIFICANT CHANGE UP (ref 12–78)
ANION GAP SERPL CALC-SCNC: 8 MMOL/L — SIGNIFICANT CHANGE UP (ref 5–17)
AST SERPL-CCNC: 20 U/L — SIGNIFICANT CHANGE UP (ref 15–37)
BILIRUB SERPL-MCNC: 0.9 MG/DL — SIGNIFICANT CHANGE UP (ref 0.2–1.2)
BUN SERPL-MCNC: 8 MG/DL — SIGNIFICANT CHANGE UP (ref 7–23)
CALCIUM SERPL-MCNC: 8 MG/DL — LOW (ref 8.5–10.1)
CHLORIDE SERPL-SCNC: 106 MMOL/L — SIGNIFICANT CHANGE UP (ref 96–108)
CO2 SERPL-SCNC: 27 MMOL/L — SIGNIFICANT CHANGE UP (ref 22–31)
CREAT SERPL-MCNC: 0.48 MG/DL — LOW (ref 0.5–1.3)
GLUCOSE SERPL-MCNC: 92 MG/DL — SIGNIFICANT CHANGE UP (ref 70–99)
HCT VFR BLD CALC: 35.3 % — LOW (ref 39–50)
HGB BLD-MCNC: 11 G/DL — LOW (ref 13–17)
MCHC RBC-ENTMCNC: 23.9 PG — LOW (ref 27–34)
MCHC RBC-ENTMCNC: 31.3 GM/DL — LOW (ref 32–36)
MCV RBC AUTO: 76.4 FL — LOW (ref 80–100)
PLATELET # BLD AUTO: 258 K/UL — SIGNIFICANT CHANGE UP (ref 150–400)
POTASSIUM SERPL-MCNC: 3.4 MMOL/L — LOW (ref 3.5–5.3)
POTASSIUM SERPL-SCNC: 3.4 MMOL/L — LOW (ref 3.5–5.3)
PROT SERPL-MCNC: 5.8 G/DL — LOW (ref 6–8.3)
RBC # BLD: 4.62 M/UL — SIGNIFICANT CHANGE UP (ref 4.2–5.8)
RBC # FLD: 16 % — HIGH (ref 10.3–14.5)
SODIUM SERPL-SCNC: 141 MMOL/L — SIGNIFICANT CHANGE UP (ref 135–145)
WBC # BLD: 14.8 K/UL — HIGH (ref 3.8–10.5)
WBC # FLD AUTO: 14.8 K/UL — HIGH (ref 3.8–10.5)

## 2017-07-01 RX ORDER — LISINOPRIL 2.5 MG/1
2.5 TABLET ORAL DAILY
Qty: 0 | Refills: 0 | Status: DISCONTINUED | OUTPATIENT
Start: 2017-07-01 | End: 2017-07-01

## 2017-07-01 RX ORDER — LISINOPRIL 2.5 MG/1
2.5 TABLET ORAL DAILY
Qty: 0 | Refills: 0 | Status: DISCONTINUED | OUTPATIENT
Start: 2017-07-01 | End: 2017-07-03

## 2017-07-01 RX ORDER — POTASSIUM CHLORIDE 20 MEQ
10 PACKET (EA) ORAL ONCE
Qty: 0 | Refills: 0 | Status: COMPLETED | OUTPATIENT
Start: 2017-07-01 | End: 2017-07-01

## 2017-07-01 RX ADMIN — ENOXAPARIN SODIUM 40 MILLIGRAM(S): 100 INJECTION SUBCUTANEOUS at 19:52

## 2017-07-01 RX ADMIN — ATORVASTATIN CALCIUM 20 MILLIGRAM(S): 80 TABLET, FILM COATED ORAL at 21:20

## 2017-07-01 RX ADMIN — Medication 81 MILLIGRAM(S): at 12:06

## 2017-07-01 RX ADMIN — CARVEDILOL PHOSPHATE 3.12 MILLIGRAM(S): 80 CAPSULE, EXTENDED RELEASE ORAL at 06:12

## 2017-07-01 RX ADMIN — SODIUM CHLORIDE 75 MILLILITER(S): 9 INJECTION INTRAMUSCULAR; INTRAVENOUS; SUBCUTANEOUS at 12:06

## 2017-07-01 RX ADMIN — Medication 100 MILLIEQUIVALENT(S): at 09:38

## 2017-07-01 NOTE — PROGRESS NOTE ADULT - SUBJECTIVE AND OBJECTIVE BOX
Neurology follow up note  COVERING DR HU [id number]      Interval History:    Patient feels ok  events noted yesterday     MEDICATIONS    enoxaparin Injectable 40 milliGRAM(s) SubCutaneous every 24 hours  acetaminophen   Tablet 650 milliGRAM(s) Oral every 6 hours PRN  acetaminophen   Tablet. 650 milliGRAM(s) Oral every 6 hours PRN  ondansetron Injectable 4 milliGRAM(s) IV Push every 6 hours PRN  sodium chloride 0.9%. 1000 milliLiter(s) IV Continuous <Continuous>  atorvastatin 20 milliGRAM(s) Oral at bedtime  aspirin  chewable 81 milliGRAM(s) Oral daily  carvedilol 3.125 milliGRAM(s) Oral every 12 hours  lisinopril 2.5 milliGRAM(s) Oral daily      Allergies    No Known Allergies    Intolerances            Vital Signs Last 24 Hrs  T(C): 37.1 (01 Jul 2017 04:10), Max: 37.3 (30 Jun 2017 12:28)  T(F): 98.8 (01 Jul 2017 04:10), Max: 99.2 (30 Jun 2017 12:28)  HR: 73 (01 Jul 2017 06:04) (65 - 77)  BP: 171/70 (01 Jul 2017 06:04) (127/72 - 188/70)  BP(mean): --  RR: 18 (01 Jul 2017 04:10) (17 - 18)  SpO2: 98% (01 Jul 2017 04:10) (95% - 98%)    REVIEW OF SYSTEMS: limited     On Neurological Examination:    Mental Status - Patient is awake in bed       Does not follow commands    Speech -     Dysarthria                   Cranial Nerves - Pupils 3 mm equal and reactive to light,   extraocular eye movements intact.   smile symmetric  intact bilateral NLF    Motor Exam -   Right upper 4/5  Left upper 1/5 slight flexion/ext at elbow and hand   Right lower 3/5  Left lower 3/5    Muscle tone - is normal all over.  No asymmetry is seen.      Sensory    Bilateral intact to light touch    GENERAL Exam: Nontoxic , No Acute Distress   	  HEENT:  normocephalic, atraumatic  		  LUNGS:  Decreased bilaterally  	  HEART: Normal S1S2   No murmur RRR        	  GI/ ABDOMEN:  Soft  Non tender    EXTREMITIES:   No Edema  No Clubbing  No Cyanosis No Edema  	   SKIN: Normal  No Ecchymosis               LABS:  CBC Full  -  ( 01 Jul 2017 07:11 )  WBC Count : 14.8 K/uL  Hemoglobin : 11.0 g/dL  Hematocrit : 35.3 %  Platelet Count - Automated : 258 K/uL  Mean Cell Volume : 76.4 fl  Mean Cell Hemoglobin : 23.9 pg  Mean Cell Hemoglobin Concentration : 31.3 gm/dL  Auto Neutrophil # : x  Auto Lymphocyte # : x  Auto Monocyte # : x  Auto Eosinophil # : x  Auto Basophil # : x  Auto Neutrophil % : x  Auto Lymphocyte % : x  Auto Monocyte % : x  Auto Eosinophil % : x  Auto Basophil % : x      07-01    141  |  106  |  8   ----------------------------<  92  3.4<L>   |  27  |  0.48<L>    Ca    8.0<L>      01 Jul 2017 07:11    TPro  5.8<L>  /  Alb  2.7<L>  /  TBili  0.9  /  DBili  x   /  AST  20  /  ALT  18  /  AlkPhos  106  07-01    Hemoglobin A1C:     LIVER FUNCTIONS - ( 01 Jul 2017 07:11 )  Alb: 2.7 g/dL / Pro: 5.8 g/dL / ALK PHOS: 106 U/L / ALT: 18 U/L / AST: 20 U/L / GGT: x           Vitamin B12         RADIOLOGY    IMPRESSION:  Slurring of speech and left-sided weakness, consistent with right middle cerebral artery territory ischemia.     What could be calculated from NIH 10 stroke scale would be .    I would recommend telemetry evaluation to rule out underlying arrhythmia..   ct head -CVA-- no significant changes from previous CT head   aspirin   I will recommend cholesterol medications.  monitor oral intake   ok to control SBP   aspiration precautions   PT eval  I will continue to followup.  spoke to son mario  6/30/17 left message today 7/1/17    26 minutes spent on total encounter; more than 50% of the visit was spent counseling and/or coordinating care by the attending physician. Neurology follow up note  COVERING DR HU ARANA89yMale      Interval History:    Patient feels ok  events noted yesterday     MEDICATIONS    enoxaparin Injectable 40 milliGRAM(s) SubCutaneous every 24 hours  acetaminophen   Tablet 650 milliGRAM(s) Oral every 6 hours PRN  acetaminophen   Tablet. 650 milliGRAM(s) Oral every 6 hours PRN  ondansetron Injectable 4 milliGRAM(s) IV Push every 6 hours PRN  sodium chloride 0.9%. 1000 milliLiter(s) IV Continuous <Continuous>  atorvastatin 20 milliGRAM(s) Oral at bedtime  aspirin  chewable 81 milliGRAM(s) Oral daily  carvedilol 3.125 milliGRAM(s) Oral every 12 hours  lisinopril 2.5 milliGRAM(s) Oral daily      Allergies    No Known Allergies    Intolerances            Vital Signs Last 24 Hrs  T(C): 37.1 (01 Jul 2017 04:10), Max: 37.3 (30 Jun 2017 12:28)  T(F): 98.8 (01 Jul 2017 04:10), Max: 99.2 (30 Jun 2017 12:28)  HR: 73 (01 Jul 2017 06:04) (65 - 77)  BP: 171/70 (01 Jul 2017 06:04) (127/72 - 188/70)  BP(mean): --  RR: 18 (01 Jul 2017 04:10) (17 - 18)  SpO2: 98% (01 Jul 2017 04:10) (95% - 98%)    REVIEW OF SYSTEMS: limited     On Neurological Examination:    Mental Status - Patient is awake in bed       Does not follow commands    Speech -     Dysarthria                   Cranial Nerves - Pupils 3 mm equal and reactive to light,   extraocular eye movements intact.   smile symmetric  intact bilateral NLF    Motor Exam -   Right upper 4/5  Left upper 1/5 slight flexion/ext at elbow and hand   Right lower 3/5  Left lower 3/5    Muscle tone - is normal all over.  No asymmetry is seen.      Sensory    Bilateral intact to light touch    GENERAL Exam: Nontoxic , No Acute Distress   	  HEENT:  normocephalic, atraumatic  		  LUNGS:  Decreased bilaterally  	  HEART: Normal S1S2   No murmur RRR        	  GI/ ABDOMEN:  Soft  Non tender    EXTREMITIES:   No Edema  No Clubbing  No Cyanosis No Edema  	   SKIN: Normal  No Ecchymosis               LABS:  CBC Full  -  ( 01 Jul 2017 07:11 )  WBC Count : 14.8 K/uL  Hemoglobin : 11.0 g/dL  Hematocrit : 35.3 %  Platelet Count - Automated : 258 K/uL  Mean Cell Volume : 76.4 fl  Mean Cell Hemoglobin : 23.9 pg  Mean Cell Hemoglobin Concentration : 31.3 gm/dL  Auto Neutrophil # : x  Auto Lymphocyte # : x  Auto Monocyte # : x  Auto Eosinophil # : x  Auto Basophil # : x  Auto Neutrophil % : x  Auto Lymphocyte % : x  Auto Monocyte % : x  Auto Eosinophil % : x  Auto Basophil % : x      07-01    141  |  106  |  8   ----------------------------<  92  3.4<L>   |  27  |  0.48<L>    Ca    8.0<L>      01 Jul 2017 07:11    TPro  5.8<L>  /  Alb  2.7<L>  /  TBili  0.9  /  DBili  x   /  AST  20  /  ALT  18  /  AlkPhos  106  07-01    Hemoglobin A1C:     LIVER FUNCTIONS - ( 01 Jul 2017 07:11 )  Alb: 2.7 g/dL / Pro: 5.8 g/dL / ALK PHOS: 106 U/L / ALT: 18 U/L / AST: 20 U/L / GGT: x           Vitamin B12         RADIOLOGY    IMPRESSION:  Slurring of speech and left-sided weakness, consistent with right middle cerebral artery territory ischemia.     What could be calculated from NIH 10 stroke scale would be .    I would recommend telemetry evaluation to rule out underlying arrhythmia..   ct head -CVA-- no significant changes from previous CT head   aspirin   I will recommend cholesterol medications.  monitor oral intake   ok to control SBP   aspiration precautions   PT eval  I will continue to followup.  spoke to son mario   7/1/17    26 minutes spent on total encounter; more than 50% of the visit was spent counseling and/or coordinating care by the attending physician.

## 2017-07-01 NOTE — PROGRESS NOTE ADULT - ASSESSMENT
CAD stable  Moderate LV dysfunction/HTN- continue Carvedilol, lisinopril added with hold parameters for SBP less than 160  continue statin,ASA

## 2017-07-01 NOTE — PROGRESS NOTE ADULT - SUBJECTIVE AND OBJECTIVE BOX
INTERVAL HPI/OVERNIGHT EVENTS: Pt s/e at bedside, no new changes  HPI:  History obtained through the charts    88 yo male who presents to the ED with a co of slurred speech.  Pt does suffer from dementia but is normally alert to person and place.  He is a fall risk but is ambulatory with a walker and assistance.  Per report pt was in his normal state of health yesterday and even when went for a walk.  He went to sleep around 10:30 pm.  Aide reports that at 5 am she went to check on him because he was snoring loudly.  She noted his mouth open with the facial droop at that time but did not think much.  Normally at this time pt is awake and trying to get out of bed but he remained sleeping and she thought he was tired.  She checked again at 6 am and he was still sleeping.  At a little after 8 am she woke the pt up and noted that his sleep was slurred and that he was not moving his left arm and could not sit up.  She called the pt family who came to check on him and then EMS was called.  Family denies history of previous CVA.  Son dose report that in February pt fell off a chair and suffered a C2 fracture.  The cervical collar was recently removed after several months of treatment.  Aide reports that last Monday pt did strike his head against the door as he was trying to leave the house.  Family further reports that he had 5 teeth removed under local on Monday but had been recovering well. (2017 13:43)    MEDICATIONS  (STANDING):  enoxaparin Injectable 40 milliGRAM(s) SubCutaneous every 24 hours  sodium chloride 0.9%. 1000 milliLiter(s) (75 mL/Hr) IV Continuous <Continuous>  atorvastatin 20 milliGRAM(s) Oral at bedtime  aspirin  chewable 81 milliGRAM(s) Oral daily  carvedilol 3.125 milliGRAM(s) Oral every 12 hours  lisinopril 2.5 milliGRAM(s) Oral daily    MEDICATIONS  (PRN):  acetaminophen   Tablet 650 milliGRAM(s) Oral every 6 hours PRN For Temp greater than 38 C (100.4 F)  acetaminophen   Tablet. 650 milliGRAM(s) Oral every 6 hours PRN Mild Pain (1 - 3)  ondansetron Injectable 4 milliGRAM(s) IV Push every 6 hours PRN Nausea      Allergies    No Known Allergies    Intolerances          ROS: unable to obtain      PHYSICAL EXAM:   Vital Signs:  Vital Signs Last 24 Hrs  T(C): 37 (2017 12:23), Max: 37.3 (2017 23:15)  T(F): 98.6 (2017 12:23), Max: 99.2 (2017 23:15)  HR: 65 (2017 12:23) (65 - 77)  BP: 126/64 (2017 12:23) (126/64 - 188/70)  BP(mean): --  RR: 18 (2017 12:23) (17 - 18)  SpO2: 94% (2017 12:23) (94% - 98%)  Daily     Daily Weight in k.2 (2017 04:10)I&O's Summary    2017 07:  -  2017 07:00  --------------------------------------------------------  IN: 1425 mL / OUT: 0 mL / NET: 1425 mL    2017 07:01  -  2017 13:06  --------------------------------------------------------  IN: 790 mL / OUT: 0 mL / NET: 790 mL        GENERAL:  noncommunicative  HEENT:  NC/AT,  conjunctivae clear and pink, no thyromegaly, nodules, adenopathy, no JVD, sclera -anicteric  CHEST:  Full & symmetric excursion, no increased effort, breath sounds clear  HEART:  Regular rhythm, S1, S2, no murmur/rub/S3/S4, no abdominal bruit, no edema  ABDOMEN:  Soft, non-tender, non-distended, normoactive bowel sounds,  no masses ,no hepato-splenomegaly, no signs of chronic liver disease  EXTEREMITIES:  no cyanosis,clubbing or edema  SKIN:  No rash/erythema/ecchymoses/petechiae/wounds/abscess/warm/dry  NEURO:  Alert, but doesn't follow commands      LABS:                        11.0   14.8  )-----------( 258      ( 2017 07:11 )             35.3     07-    141  |  106  |  8   ----------------------------<  92  3.4<L>   |  27  |  0.48<L>    Ca    8.0<L>      2017 07:11    TPro  5.8<L>  /  Alb  2.7<L>  /  TBili  0.9  /  DBili  x   /  AST  20  /  ALT  18  /  AlkPhos  106  07-01        amylase   lipase  RADIOLOGY & ADDITIONAL TESTS:

## 2017-07-01 NOTE — PROGRESS NOTE ADULT - SUBJECTIVE AND OBJECTIVE BOX
Patient is a 89y old  Male who presents with a chief complaint of sluerred speech (26 Jun 2017 11:31)      INTERVAL HPI/OVERNIGHT EVENTS:  T(C): 37 (07-01-17 @ 12:23), Max: 37.3 (06-30-17 @ 23:15)  HR: 65 (07-01-17 @ 12:23) (65 - 77)  BP: 126/64 (07-01-17 @ 12:23) (126/64 - 188/70)  RR: 18 (07-01-17 @ 12:23) (17 - 18)  SpO2: 94% (07-01-17 @ 12:23) (94% - 98%)  Wt(kg): --  I&O's Summary    30 Jun 2017 07:01  -  01 Jul 2017 07:00  --------------------------------------------------------  IN: 1425 mL / OUT: 0 mL / NET: 1425 mL    01 Jul 2017 07:01  -  01 Jul 2017 14:24  --------------------------------------------------------  IN: 790 mL / OUT: 0 mL / NET: 790 mL        LABS:                        11.0   14.8  )-----------( 258      ( 01 Jul 2017 07:11 )             35.3     07-01    141  |  106  |  8   ----------------------------<  92  3.4<L>   |  27  |  0.48<L>    Ca    8.0<L>      01 Jul 2017 07:11    TPro  5.8<L>  /  Alb  2.7<L>  /  TBili  0.9  /  DBili  x   /  AST  20  /  ALT  18  /  AlkPhos  106  07-01        CAPILLARY BLOOD GLUCOSE                MEDICATIONS  (STANDING):  enoxaparin Injectable 40 milliGRAM(s) SubCutaneous every 24 hours  sodium chloride 0.9%. 1000 milliLiter(s) (75 mL/Hr) IV Continuous <Continuous>  atorvastatin 20 milliGRAM(s) Oral at bedtime  aspirin  chewable 81 milliGRAM(s) Oral daily  carvedilol 3.125 milliGRAM(s) Oral every 12 hours  lisinopril 2.5 milliGRAM(s) Oral daily    MEDICATIONS  (PRN):  acetaminophen   Tablet 650 milliGRAM(s) Oral every 6 hours PRN For Temp greater than 38 C (100.4 F)  acetaminophen   Tablet. 650 milliGRAM(s) Oral every 6 hours PRN Mild Pain (1 - 3)  ondansetron Injectable 4 milliGRAM(s) IV Push every 6 hours PRN Nausea          PHYSICAL EXAM:  GENERAL: frail  HEAD:  Atraumatic, Normocephalic  CHEST/LUNG: Clear to percussion bilaterally; No rales, rhonchi, wheezing, or rubs  HEART: Regular rate and rhythm; No murmurs, rubs, or gallops  ABDOMEN: Soft, Nontender, Nondistended; Bowel sounds present  EXTREMITIES:  2+ Peripheral Pulses, No clubbing, cyanosis, or edema  LYMPH: No lymphadenopathy noted  SKIN: No rashes or lesions    Care Discussed with Consultants/Other Providers [ ] YES  [ ] NO

## 2017-07-01 NOTE — PROGRESS NOTE ADULT - SUBJECTIVE AND OBJECTIVE BOX
CHIEF COMPLAINT: Patient is a 89y old  Male who presents with a chief complaint of sluerred speech (26 Jun 2017 11:31)      Follow Up: [ ] Chest Pain      [ ] Dyspnea     [ ] Palpitations    [ ] Atrial Fibrillation     [ ] Ventricular Dysrhythmia    [ ] Abnormal EKG                      [ ] Abnormal Cardiac Enzymes     [ ] Valvular Disease   [ ] CAD  [ x] Hypertension [ ] CHF     HPI:  History obtained through the charts    88 yo male who presents to the ED with a co of slurred speech.  Pt does suffer from dementia but is normally alert to person and place.  He is a fall risk but is ambulatory with a walker and assistance.  Per report pt was in his normal state of health yesterday and even when went for a walk.  He went to sleep around 10:30 pm.  Aide reports that at 5 am she went to check on him because he was snoring loudly.  She noted his mouth open with the facial droop at that time but did not think much.  Normally at this time pt is awake and trying to get out of bed but he remained sleeping and she thought he was tired.  She checked again at 6 am and he was still sleeping.  At a little after 8 am she woke the pt up and noted that his sleep was slurred and that he was not moving his left arm and could not sit up.  She called the pt family who came to check on him and then EMS was called.  Family denies history of previous CVA.  Son dose report that in February pt fell off a chair and suffered a C2 fracture.  The cervical collar was recently removed after several months of treatment.  Aide reports that last Monday pt did strike his head against the door as he was trying to leave the house.  Family further reports that he had 5 teeth removed under local on Monday but had been recovering well. (25 Jun 2017 13:43)      PAST MEDICAL & SURGICAL HISTORY:  CAD (coronary artery disease)  Dementia  S/P CABG x 3      MEDICATIONS  (STANDING):  enoxaparin Injectable 40 milliGRAM(s) SubCutaneous every 24 hours  sodium chloride 0.9%. 1000 milliLiter(s) (75 mL/Hr) IV Continuous <Continuous>  atorvastatin 20 milliGRAM(s) Oral at bedtime  aspirin  chewable 81 milliGRAM(s) Oral daily  carvedilol 3.125 milliGRAM(s) Oral every 12 hours  lisinopril 2.5 milliGRAM(s) Oral daily    MEDICATIONS  (PRN):  acetaminophen   Tablet 650 milliGRAM(s) Oral every 6 hours PRN For Temp greater than 38 C (100.4 F)  acetaminophen   Tablet. 650 milliGRAM(s) Oral every 6 hours PRN Mild Pain (1 - 3)  ondansetron Injectable 4 milliGRAM(s) IV Push every 6 hours PRN Nausea      Allergies    No Known Allergies    Intolerances        REVIEW OF SYSTEMS:      RESPIRATORY No shortness of breath  CARDIOVASCULAR: No chest pain or palpitations    Vital Signs Last 24 Hrs  T(C): 37.1 (01 Jul 2017 04:10), Max: 37.3 (30 Jun 2017 12:28)  T(F): 98.8 (01 Jul 2017 04:10), Max: 99.2 (30 Jun 2017 12:28)  HR: 73 (01 Jul 2017 06:04) (65 - 84)  BP: 171/70 (01 Jul 2017 06:04) (127/72 - 188/70)  BP(mean): --  RR: 18 (01 Jul 2017 04:10) (17 - 19)  SpO2: 98% (01 Jul 2017 04:10) (95% - 98%)    I&O's Summary    29 Jun 2017 07:01  -  30 Jun 2017 07:00  --------------------------------------------------------  IN: 1325 mL / OUT: 0 mL / NET: 1325 mL    30 Jun 2017 07:01  -  01 Jul 2017 06:10  --------------------------------------------------------  IN: 1425 mL / OUT: 0 mL / NET: 1425 mL        PHYSICAL EXAM:    Constitutional: NAD, awake and alert, well-developed  Eyes:  EOMI,  Pupils round, No oral cyanosis  Pulmonary: Non-labored, breath sounds are clear bilaterally, No wheezing, rales or rhonchi  Cardiovascular: Regular, S1 and S2, No murmurs, rubs, gallops or clicks   Extremities: No lower extremity clubbing cyanosis or edema  Neurological: Alert,   Skin: No rashes.    LABS: All Labs Reviewed:                        11.4   11.6  )-----------( 251      ( 30 Jun 2017 06:32 )             36.6                         11.4   11.8  )-----------( 262      ( 29 Jun 2017 06:43 )             36.0                         11.5   11.6  )-----------( 252      ( 28 Jun 2017 07:28 )             36.6     30 Jun 2017 06:32    139    |  104    |  8      ----------------------------<  76     3.1     |  25     |  0.51   29 Jun 2017 06:43    139    |  106    |  15     ----------------------------<  61     3.3     |  20     |  0.59   28 Jun 2017 07:28    137    |  105    |  15     ----------------------------<  64     3.3     |  22     |  0.57     Ca    8.3        30 Jun 2017 06:32  Ca    7.9        29 Jun 2017 06:43  Ca    7.8        28 Jun 2017 07:28    TPro  6.3    /  Alb  2.9    /  TBili  0.9    /  DBili  x      /  AST  24     /  ALT  19     /  AlkPhos  118    30 Jun 2017 06:32  TPro  6.5    /  Alb  2.9    /  TBili  1.0    /  DBili  x      /  AST  25     /  ALT  16     /  AlkPhos  126    29 Jun 2017 06:43  TPro  6.4    /  Alb  2.9    /  TBili  1.0    /  DBili  x      /  AST  21     /  ALT  15     /  AlkPhos  129    28 Jun 2017 07:28          Blood Culture:

## 2017-07-01 NOTE — PROGRESS NOTE ADULT - ASSESSMENT
Problem/Plan - 1:  ·  Problem: Slurred speech.  Plan: ro sroke  neuro fu  cw asa  speech and swallow evaluation appreciated, start dysphagia diet  PT consult      Problem/Plan - 2:  ·  Problem: S/P CABG x 3.  Plan: cards called  will monitor.   cw asa and carvidolol    Pts family wants dental sutures to be removed. Called oral surgery they refused to do it inpt, pt to see dentist at rehab    dc planning to rehab monday

## 2017-07-02 LAB
ALBUMIN SERPL ELPH-MCNC: 2.6 G/DL — LOW (ref 3.3–5)
ALP SERPL-CCNC: 102 U/L — SIGNIFICANT CHANGE UP (ref 40–120)
ALT FLD-CCNC: 17 U/L — SIGNIFICANT CHANGE UP (ref 12–78)
ANION GAP SERPL CALC-SCNC: 7 MMOL/L — SIGNIFICANT CHANGE UP (ref 5–17)
AST SERPL-CCNC: 21 U/L — SIGNIFICANT CHANGE UP (ref 15–37)
BILIRUB SERPL-MCNC: 0.8 MG/DL — SIGNIFICANT CHANGE UP (ref 0.2–1.2)
BUN SERPL-MCNC: 8 MG/DL — SIGNIFICANT CHANGE UP (ref 7–23)
CALCIUM SERPL-MCNC: 7.7 MG/DL — LOW (ref 8.5–10.1)
CHLORIDE SERPL-SCNC: 107 MMOL/L — SIGNIFICANT CHANGE UP (ref 96–108)
CO2 SERPL-SCNC: 28 MMOL/L — SIGNIFICANT CHANGE UP (ref 22–31)
CREAT SERPL-MCNC: 0.47 MG/DL — LOW (ref 0.5–1.3)
GLUCOSE SERPL-MCNC: 84 MG/DL — SIGNIFICANT CHANGE UP (ref 70–99)
HCT VFR BLD CALC: 35.4 % — LOW (ref 39–50)
HGB BLD-MCNC: 11.1 G/DL — LOW (ref 13–17)
MCHC RBC-ENTMCNC: 23.9 PG — LOW (ref 27–34)
MCHC RBC-ENTMCNC: 31.4 GM/DL — LOW (ref 32–36)
MCV RBC AUTO: 76.1 FL — LOW (ref 80–100)
PLATELET # BLD AUTO: 252 K/UL — SIGNIFICANT CHANGE UP (ref 150–400)
POTASSIUM SERPL-MCNC: 3.3 MMOL/L — LOW (ref 3.5–5.3)
POTASSIUM SERPL-SCNC: 3.3 MMOL/L — LOW (ref 3.5–5.3)
PROT SERPL-MCNC: 5.9 G/DL — LOW (ref 6–8.3)
RBC # BLD: 4.66 M/UL — SIGNIFICANT CHANGE UP (ref 4.2–5.8)
RBC # FLD: 16.2 % — HIGH (ref 10.3–14.5)
SODIUM SERPL-SCNC: 142 MMOL/L — SIGNIFICANT CHANGE UP (ref 135–145)
WBC # BLD: 11.1 K/UL — HIGH (ref 3.8–10.5)
WBC # FLD AUTO: 11.1 K/UL — HIGH (ref 3.8–10.5)

## 2017-07-02 RX ORDER — POTASSIUM CHLORIDE 20 MEQ
10 PACKET (EA) ORAL ONCE
Qty: 0 | Refills: 0 | Status: COMPLETED | OUTPATIENT
Start: 2017-07-02 | End: 2017-07-02

## 2017-07-02 RX ADMIN — ATORVASTATIN CALCIUM 20 MILLIGRAM(S): 80 TABLET, FILM COATED ORAL at 20:58

## 2017-07-02 RX ADMIN — Medication 100 MILLIEQUIVALENT(S): at 13:44

## 2017-07-02 RX ADMIN — LISINOPRIL 2.5 MILLIGRAM(S): 2.5 TABLET ORAL at 06:07

## 2017-07-02 RX ADMIN — Medication 81 MILLIGRAM(S): at 11:13

## 2017-07-02 RX ADMIN — CARVEDILOL PHOSPHATE 3.12 MILLIGRAM(S): 80 CAPSULE, EXTENDED RELEASE ORAL at 06:07

## 2017-07-02 RX ADMIN — ENOXAPARIN SODIUM 40 MILLIGRAM(S): 100 INJECTION SUBCUTANEOUS at 20:57

## 2017-07-02 NOTE — PROGRESS NOTE ADULT - SUBJECTIVE AND OBJECTIVE BOX
INTERVAL HPI/OVERNIGHT EVENTS:  HPI:  History obtained through the charts    88 yo male who presents to the ED with a co of slurred speech.  Pt does suffer from dementia but is normally alert to person and place.  He is a fall risk but is ambulatory with a walker and assistance.  Per report pt was in his normal state of health yesterday and even when went for a walk.  He went to sleep around 10:30 pm.  Aide reports that at 5 am she went to check on him because he was snoring loudly.  She noted his mouth open with the facial droop at that time but did not think much.  Normally at this time pt is awake and trying to get out of bed but he remained sleeping and she thought he was tired.  She checked again at 6 am and he was still sleeping.  At a little after 8 am she woke the pt up and noted that his sleep was slurred and that he was not moving his left arm and could not sit up.  She called the pt family who came to check on him and then EMS was called.  Family denies history of previous CVA.  Son dose report that in February pt fell off a chair and suffered a C2 fracture.  The cervical collar was recently removed after several months of treatment.  Aide reports that last Monday pt did strike his head against the door as he was trying to leave the house.  Family further reports that he had 5 teeth removed under local on Monday but had been recovering well. (2017 13:43)    MEDICATIONS  (STANDING):  enoxaparin Injectable 40 milliGRAM(s) SubCutaneous every 24 hours  sodium chloride 0.9%. 1000 milliLiter(s) (75 mL/Hr) IV Continuous <Continuous>  atorvastatin 20 milliGRAM(s) Oral at bedtime  aspirin  chewable 81 milliGRAM(s) Oral daily  carvedilol 3.125 milliGRAM(s) Oral every 12 hours  lisinopril 2.5 milliGRAM(s) Oral daily    MEDICATIONS  (PRN):  acetaminophen   Tablet 650 milliGRAM(s) Oral every 6 hours PRN For Temp greater than 38 C (100.4 F)  acetaminophen   Tablet. 650 milliGRAM(s) Oral every 6 hours PRN Mild Pain (1 - 3)  ondansetron Injectable 4 milliGRAM(s) IV Push every 6 hours PRN Nausea      Allergies    No Known Allergies    Intolerances          ROS: unable to obtain      PHYSICAL EXAM:   Vital Signs:  Vital Signs Last 24 Hrs  T(C): 36.5 (2017 07:37), Max: 37.2 (2017 20:45)  T(F): 97.7 (2017 07:37), Max: 99 (2017 20:45)  HR: 63 (2017 07:37) (59 - 67)  BP: 147/92 (2017 07:37) (122/58 - 174/73)  BP(mean): --  RR: 18 (2017 07:37) (16 - 18)  SpO2: 97% (2017 07:37) (94% - 97%)  Daily     Daily Weight in k (2017 04:00)I&O's Summary    2017 07:01  -  2017 07:00  --------------------------------------------------------  IN: 1795 mL / OUT: 0 mL / NET: 1795 mL    2017 07:01  -  2017 11:16  --------------------------------------------------------  IN: 615 mL / OUT: 0 mL / NET: 615 mL        GENERAL:  NAD  HEENT:  NC/AT,  conjunctivae clear and pink, no thyromegaly, nodules, adenopathy, no JVD, sclera -anicteric  CHEST:  Full & symmetric excursion, no increased effort, breath sounds clear  HEART:  Regular rhythm, S1, S2, no murmur/rub/S3/S4, no abdominal bruit, no edema  ABDOMEN:  Soft, non-tender, non-distended, normoactive bowel sounds,  no masses ,no hepato-splenomegaly, no signs of chronic liver disease  EXTEREMITIES:  no cyanosis,clubbing or edema  SKIN:  No rash/erythema/ecchymoses/petechiae/wounds/abscess/warm/dry  NEURO:  Alert, oriented, no asterixis, no tremor, no encephalopathy      LABS:                        11.1   11.1  )-----------( 252      ( 2017 06:12 )             35.4     07-02    142  |  107  |  8   ----------------------------<  84  3.3<L>   |  28  |  0.47<L>    Ca    7.7<L>      2017 06:12    TPro  5.9<L>  /  Alb  2.6<L>  /  TBili  0.8  /  DBili  x   /  AST  21  /  ALT  17  /  AlkPhos  102  07-02        amylase   lipase  RADIOLOGY & ADDITIONAL TESTS:

## 2017-07-02 NOTE — PROGRESS NOTE ADULT - ASSESSMENT
Problem/Plan - 1:  ·  Problem: Slurred speech.  Plan: ro sroke  neuro fu  cw asa  speech and swallow evaluation appreciated, start dysphagia diet  PT consult      Problem/Plan - 2:  ·  Problem: S/P CABG x 3.  Plan: cards called  will monitor.   cw asa and carvidolol    Pts family wants dental sutures to be removed. Called oral surgery they refused to do it inpt, pt to see dentist at rehab    dc planning to rehab tomorrow, case discussed with daughter at length

## 2017-07-02 NOTE — PROGRESS NOTE ADULT - SUBJECTIVE AND OBJECTIVE BOX
Neurology follow up note  COVERING DR HU [id number]      Interval History:    Patient feels ok no new complaints.    MEDICATIONS    enoxaparin Injectable 40 milliGRAM(s) SubCutaneous every 24 hours  acetaminophen   Tablet 650 milliGRAM(s) Oral every 6 hours PRN  acetaminophen   Tablet. 650 milliGRAM(s) Oral every 6 hours PRN  ondansetron Injectable 4 milliGRAM(s) IV Push every 6 hours PRN  sodium chloride 0.9%. 1000 milliLiter(s) IV Continuous <Continuous>  atorvastatin 20 milliGRAM(s) Oral at bedtime  aspirin  chewable 81 milliGRAM(s) Oral daily  carvedilol 3.125 milliGRAM(s) Oral every 12 hours  lisinopril 2.5 milliGRAM(s) Oral daily      Allergies    No Known Allergies    Intolerances      Vital Signs Last 24 Hrs  T(C): 36.5 (02 Jul 2017 07:37), Max: 37.2 (01 Jul 2017 20:45)  T(F): 97.7 (02 Jul 2017 07:37), Max: 99 (01 Jul 2017 20:45)  HR: 63 (02 Jul 2017 07:37) (59 - 67)  BP: 147/92 (02 Jul 2017 07:37) (122/58 - 174/73)  BP(mean): --  RR: 18 (02 Jul 2017 07:37) (16 - 18)  SpO2: 97% (02 Jul 2017 07:37) (94% - 97%)    REVIEW OF SYSTEMS: limited     On Neurological Examination:    Mental Status - Patient is awake in bed       Does not follow commands    Speech -     Dysarthria                   Cranial Nerves - Pupils 3 mm equal and reactive to light,   extraocular eye movements intact.   smile symmetric  intact bilateral NLF    Motor Exam -   Right upper 4/5  Left upper 1/5 slight flexion/ext at elbow and hand   Right lower 3/5  Left lower 3/5    Muscle tone - is normal all over.  No asymmetry is seen.      Sensory    Bilateral intact to light touch    GENERAL Exam: Nontoxic , No Acute Distress   	  HEENT:  normocephalic, atraumatic  		  LUNGS:  Decreased bilaterally  	  HEART: Normal S1S2   No murmur RRR        	  GI/ ABDOMEN:  Soft  Non tender    EXTREMITIES:   No Edema  No Clubbing  No Cyanosis No Edema  	   SKIN: Normal  No Ecchymosis            LABS:  CBC Full  -  ( 02 Jul 2017 06:12 )  WBC Count : 11.1 K/uL  Hemoglobin : 11.1 g/dL  Hematocrit : 35.4 %  Platelet Count - Automated : 252 K/uL  Mean Cell Volume : 76.1 fl  Mean Cell Hemoglobin : 23.9 pg  Mean Cell Hemoglobin Concentration : 31.4 gm/dL  Auto Neutrophil # : x  Auto Lymphocyte # : x  Auto Monocyte # : x  Auto Eosinophil # : x  Auto Basophil # : x  Auto Neutrophil % : x  Auto Lymphocyte % : x  Auto Monocyte % : x  Auto Eosinophil % : x  Auto Basophil % : x      07-02    142  |  107  |  8   ----------------------------<  84  3.3<L>   |  28  |  0.47<L>    Ca    7.7<L>      02 Jul 2017 06:12    TPro  5.9<L>  /  Alb  2.6<L>  /  TBili  0.8  /  DBili  x   /  AST  21  /  ALT  17  /  AlkPhos  102  07-02    Hemoglobin A1C:     LIVER FUNCTIONS - ( 02 Jul 2017 06:12 )  Alb: 2.6 g/dL / Pro: 5.9 g/dL / ALK PHOS: 102 U/L / ALT: 17 U/L / AST: 21 U/L / GGT: x           Vitamin B12         RADIOLOGY    IMPRESSION:  Slurring of speech and left-sided weakness, consistent with right middle cerebral artery territory ischemia.     What could be calculated from NIH 10 stroke scale would be .    I would recommend telemetry evaluation to rule out underlying arrhythmia..   ct head -CVA-- no significant changes from previous CT head   aspirin   I will recommend cholesterol medications.  monitor oral intake   ok to control SBP   aspiration precautions   PT eval  overall no significant change in neurologic     spoke to son mario   7/2/17    26 minutes spent on total encounter; more than 50% of the visit was spent counseling and/or coordinating care by the attending physician.

## 2017-07-02 NOTE — PROGRESS NOTE ADULT - SUBJECTIVE AND OBJECTIVE BOX
Patient is a 89y old  Male who presents with a chief complaint of sluerred speech (26 Jun 2017 11:31)  NAD    INTERVAL HPI/OVERNIGHT EVENTS:  T(C): 37 (07-02-17 @ 15:15), Max: 37.2 (07-01-17 @ 20:45)  HR: 71 (07-02-17 @ 15:15) (59 - 71)  BP: 146/77 (07-02-17 @ 15:15) (122/58 - 174/73)  RR: 17 (07-02-17 @ 15:15) (16 - 18)  SpO2: 98% (07-02-17 @ 15:15) (95% - 98%)  Wt(kg): --  I&O's Summary    01 Jul 2017 07:01  -  02 Jul 2017 07:00  --------------------------------------------------------  IN: 1795 mL / OUT: 0 mL / NET: 1795 mL    02 Jul 2017 07:01  -  02 Jul 2017 18:05  --------------------------------------------------------  IN: 1330 mL / OUT: 0 mL / NET: 1330 mL        LABS:                        11.1   11.1  )-----------( 252      ( 02 Jul 2017 06:12 )             35.4     07-02    142  |  107  |  8   ----------------------------<  84  3.3<L>   |  28  |  0.47<L>    Ca    7.7<L>      02 Jul 2017 06:12    TPro  5.9<L>  /  Alb  2.6<L>  /  TBili  0.8  /  DBili  x   /  AST  21  /  ALT  17  /  AlkPhos  102  07-02        CAPILLARY BLOOD GLUCOSE                MEDICATIONS  (STANDING):  enoxaparin Injectable 40 milliGRAM(s) SubCutaneous every 24 hours  sodium chloride 0.9%. 1000 milliLiter(s) (75 mL/Hr) IV Continuous <Continuous>  atorvastatin 20 milliGRAM(s) Oral at bedtime  aspirin  chewable 81 milliGRAM(s) Oral daily  carvedilol 3.125 milliGRAM(s) Oral every 12 hours  lisinopril 2.5 milliGRAM(s) Oral daily    MEDICATIONS  (PRN):  acetaminophen   Tablet 650 milliGRAM(s) Oral every 6 hours PRN For Temp greater than 38 C (100.4 F)  acetaminophen   Tablet. 650 milliGRAM(s) Oral every 6 hours PRN Mild Pain (1 - 3)  ondansetron Injectable 4 milliGRAM(s) IV Push every 6 hours PRN Nausea          PHYSICAL EXAM:  GENERAL: NAD, frail apearing  HEAD:  Atraumatic, Normocephalic  CHEST/LUNG: Clear to percussion bilaterally; No rales, rhonchi, wheezing, or rubs  HEART: Regular rate and rhythm; No murmurs, rubs, or gallops  ABDOMEN: Soft, Nontender, Nondistended; Bowel sounds present  EXTREMITIES:  2+ Peripheral Pulses, No clubbing, cyanosis, or edema  LYMPH: No lymphadenopathy noted  SKIN: No rashes or lesions    Care Discussed with Consultants/Other Providers [* ] YES  [ ] NO

## 2017-07-02 NOTE — PROGRESS NOTE ADULT - SUBJECTIVE AND OBJECTIVE BOX
CHIEF COMPLAINT: Patient is a 89y old  Male who presents with a chief complaint of sluerred speech (26 Jun 2017 11:31)      Follow Up: [ ] Chest Pain      [ ] Dyspnea     [ ] Palpitations    [ ] Atrial Fibrillation     [ ] Ventricular Dysrhythmia    [ ] Abnormal EKG                      [ ] Abnormal Cardiac Enzymes     [ ] Valvular Disease   [ ] CAD  [x ] Hypertension [ ] CHF     HPI:  History obtained through the charts    88 yo male who presents to the ED with a co of slurred speech.  Pt does suffer from dementia but is normally alert to person and place.  He is a fall risk but is ambulatory with a walker and assistance.  Per report pt was in his normal state of health yesterday and even when went for a walk.  He went to sleep around 10:30 pm.  Aide reports that at 5 am she went to check on him because he was snoring loudly.  She noted his mouth open with the facial droop at that time but did not think much.  Normally at this time pt is awake and trying to get out of bed but he remained sleeping and she thought he was tired.  She checked again at 6 am and he was still sleeping.  At a little after 8 am she woke the pt up and noted that his sleep was slurred and that he was not moving his left arm and could not sit up.  She called the pt family who came to check on him and then EMS was called.  Family denies history of previous CVA.  Son dose report that in February pt fell off a chair and suffered a C2 fracture.  The cervical collar was recently removed after several months of treatment.  Aide reports that last Monday pt did strike his head against the door as he was trying to leave the house.  Family further reports that he had 5 teeth removed under local on Monday but had been recovering well. (25 Jun 2017 13:43)      PAST MEDICAL & SURGICAL HISTORY:  CAD (coronary artery disease)  Dementia  S/P CABG x 3      MEDICATIONS  (STANDING):  enoxaparin Injectable 40 milliGRAM(s) SubCutaneous every 24 hours  sodium chloride 0.9%. 1000 milliLiter(s) (75 mL/Hr) IV Continuous <Continuous>  atorvastatin 20 milliGRAM(s) Oral at bedtime  aspirin  chewable 81 milliGRAM(s) Oral daily  carvedilol 3.125 milliGRAM(s) Oral every 12 hours  lisinopril 2.5 milliGRAM(s) Oral daily    MEDICATIONS  (PRN):  acetaminophen   Tablet 650 milliGRAM(s) Oral every 6 hours PRN For Temp greater than 38 C (100.4 F)  acetaminophen   Tablet. 650 milliGRAM(s) Oral every 6 hours PRN Mild Pain (1 - 3)  ondansetron Injectable 4 milliGRAM(s) IV Push every 6 hours PRN Nausea      Allergies    No Known Allergies    Intolerances        REVIEW OF SYSTEMS:   unable to obtain    Vital Signs Last 24 Hrs  T(C): 36.5 (02 Jul 2017 07:37), Max: 37.2 (01 Jul 2017 20:45)  T(F): 97.7 (02 Jul 2017 07:37), Max: 99 (01 Jul 2017 20:45)  HR: 63 (02 Jul 2017 07:37) (59 - 67)  BP: 147/92 (02 Jul 2017 07:37) (122/58 - 174/73)  BP(mean): --  RR: 18 (02 Jul 2017 07:37) (16 - 18)  SpO2: 97% (02 Jul 2017 07:37) (94% - 97%)    I&O's Summary    01 Jul 2017 07:01  -  02 Jul 2017 07:00  --------------------------------------------------------  IN: 1795 mL / OUT: 0 mL / NET: 1795 mL    02 Jul 2017 07:01  -  02 Jul 2017 11:13  --------------------------------------------------------  IN: 615 mL / OUT: 0 mL / NET: 615 mL        PHYSICAL EXAM:    Constitutional well-developed  Pulmonary: Non-labored, breath sounds are clear bilaterally, No wheezing, rales or rhonchi  Cardiovascular: Regular, S1 and S2, No murmurs, rubs, gallops or clicks  Gastrointestinal: Bowel Sounds present, soft, nontender.   Extremities: No lower extremity clubbing cyanosis or edema  Neurological responds verbally to sternal rub. Not opening eyes    LABS: All Labs Reviewed:                        11.1   11.1  )-----------( 252      ( 02 Jul 2017 06:12 )             35.4                         11.0   14.8  )-----------( 258      ( 01 Jul 2017 07:11 )             35.3                         11.4   11.6  )-----------( 251      ( 30 Jun 2017 06:32 )             36.6     02 Jul 2017 06:12    142    |  107    |  8      ----------------------------<  84     3.3     |  28     |  0.47   01 Jul 2017 07:11    141    |  106    |  8      ----------------------------<  92     3.4     |  27     |  0.48   30 Jun 2017 06:32    139    |  104    |  8      ----------------------------<  76     3.1     |  25     |  0.51     Ca    7.7        02 Jul 2017 06:12  Ca    8.0        01 Jul 2017 07:11  Ca    8.3        30 Jun 2017 06:32    TPro  5.9    /  Alb  2.6    /  TBili  0.8    /  DBili  x      /  AST  21     /  ALT  17     /  AlkPhos  102    02 Jul 2017 06:12  TPro  5.8    /  Alb  2.7    /  TBili  0.9    /  DBili  x      /  AST  20     /  ALT  18     /  AlkPhos  106    01 Jul 2017 07:11  TPro  6.3    /  Alb  2.9    /  TBili  0.9    /  DBili  x      /  AST  24     /  ALT  19     /  AlkPhos  118    30 Jun 2017 06:32          Blood Culture:

## 2017-07-03 VITALS
SYSTOLIC BLOOD PRESSURE: 146 MMHG | RESPIRATION RATE: 19 BRPM | TEMPERATURE: 98 F | DIASTOLIC BLOOD PRESSURE: 65 MMHG | OXYGEN SATURATION: 97 % | HEART RATE: 69 BPM

## 2017-07-03 PROCEDURE — 83690 ASSAY OF LIPASE: CPT

## 2017-07-03 PROCEDURE — 72125 CT NECK SPINE W/O DYE: CPT

## 2017-07-03 PROCEDURE — 85730 THROMBOPLASTIN TIME PARTIAL: CPT

## 2017-07-03 PROCEDURE — 82553 CREATINE MB FRACTION: CPT

## 2017-07-03 PROCEDURE — 93880 EXTRACRANIAL BILAT STUDY: CPT

## 2017-07-03 PROCEDURE — 97530 THERAPEUTIC ACTIVITIES: CPT

## 2017-07-03 PROCEDURE — 87086 URINE CULTURE/COLONY COUNT: CPT

## 2017-07-03 PROCEDURE — 99285 EMERGENCY DEPT VISIT HI MDM: CPT | Mod: 25

## 2017-07-03 PROCEDURE — 85610 PROTHROMBIN TIME: CPT

## 2017-07-03 PROCEDURE — 96372 THER/PROPH/DIAG INJ SC/IM: CPT

## 2017-07-03 PROCEDURE — 70450 CT HEAD/BRAIN W/O DYE: CPT

## 2017-07-03 PROCEDURE — 80061 LIPID PANEL: CPT

## 2017-07-03 PROCEDURE — 97116 GAIT TRAINING THERAPY: CPT

## 2017-07-03 PROCEDURE — 84484 ASSAY OF TROPONIN QUANT: CPT

## 2017-07-03 PROCEDURE — 81001 URINALYSIS AUTO W/SCOPE: CPT

## 2017-07-03 PROCEDURE — 97162 PT EVAL MOD COMPLEX 30 MIN: CPT

## 2017-07-03 PROCEDURE — 71045 X-RAY EXAM CHEST 1 VIEW: CPT

## 2017-07-03 PROCEDURE — 83880 ASSAY OF NATRIURETIC PEPTIDE: CPT

## 2017-07-03 PROCEDURE — 93306 TTE W/DOPPLER COMPLETE: CPT

## 2017-07-03 PROCEDURE — 36415 COLL VENOUS BLD VENIPUNCTURE: CPT

## 2017-07-03 PROCEDURE — 85027 COMPLETE CBC AUTOMATED: CPT

## 2017-07-03 PROCEDURE — 80053 COMPREHEN METABOLIC PANEL: CPT

## 2017-07-03 PROCEDURE — 97110 THERAPEUTIC EXERCISES: CPT

## 2017-07-03 PROCEDURE — 82150 ASSAY OF AMYLASE: CPT

## 2017-07-03 PROCEDURE — 93005 ELECTROCARDIOGRAM TRACING: CPT

## 2017-07-03 RX ORDER — POTASSIUM CHLORIDE 20 MEQ
1 PACKET (EA) ORAL
Qty: 0 | Refills: 0 | COMMUNITY
Start: 2017-07-03

## 2017-07-03 RX ORDER — POTASSIUM CHLORIDE 20 MEQ
10 PACKET (EA) ORAL DAILY
Qty: 0 | Refills: 0 | Status: DISCONTINUED | OUTPATIENT
Start: 2017-07-03 | End: 2017-07-03

## 2017-07-03 RX ORDER — CARVEDILOL PHOSPHATE 80 MG/1
1 CAPSULE, EXTENDED RELEASE ORAL
Qty: 0 | Refills: 0 | COMMUNITY
Start: 2017-07-03

## 2017-07-03 RX ORDER — LISINOPRIL 2.5 MG/1
1 TABLET ORAL
Qty: 0 | Refills: 0 | COMMUNITY
Start: 2017-07-03

## 2017-07-03 RX ADMIN — Medication 81 MILLIGRAM(S): at 11:51

## 2017-07-03 RX ADMIN — Medication 10 MILLIEQUIVALENT(S): at 11:51

## 2017-07-03 NOTE — PROGRESS NOTE ADULT - PROBLEM SELECTOR PROBLEM 1
Dysphagia as late effect of cerebrovascular accident (CVA)

## 2017-07-03 NOTE — PROGRESS NOTE ADULT - SUBJECTIVE AND OBJECTIVE BOX
Brief note:    Pt sleeping comfortably    · Assessment		  Problem/Plan - 1:  ·  CAD/CABG, dementia s/p CVA  BP stable  cw asa and carvidolol  dc planning to rehab (per chart)    will sign off    call if needed

## 2017-07-03 NOTE — PROGRESS NOTE ADULT - SUBJECTIVE AND OBJECTIVE BOX
Neurology follow up note    MARIO [id number]      Interval History:    Patient feels ok no new complaints.    MEDICATIONS    enoxaparin Injectable 40 milliGRAM(s) SubCutaneous every 24 hours  acetaminophen   Tablet 650 milliGRAM(s) Oral every 6 hours PRN  acetaminophen   Tablet. 650 milliGRAM(s) Oral every 6 hours PRN  ondansetron Injectable 4 milliGRAM(s) IV Push every 6 hours PRN  sodium chloride 0.9%. 1000 milliLiter(s) IV Continuous <Continuous>  atorvastatin 20 milliGRAM(s) Oral at bedtime  aspirin  chewable 81 milliGRAM(s) Oral daily  carvedilol 3.125 milliGRAM(s) Oral every 12 hours  lisinopril 2.5 milliGRAM(s) Oral daily  potassium chloride    Tablet ER 10 milliEquivalent(s) Oral daily      Allergies    No Known Allergies    Intolerances            Vital Signs Last 24 Hrs  T(C): 36.8 (03 Jul 2017 11:00), Max: 37.2 (02 Jul 2017 19:51)  T(F): 98.3 (03 Jul 2017 11:00), Max: 98.9 (02 Jul 2017 19:51)  HR: 62 (03 Jul 2017 11:00) (62 - 76)  BP: 132/84 (03 Jul 2017 11:00) (132/84 - 168/81)  BP(mean): --  RR: 18 (03 Jul 2017 11:00) (17 - 20)  SpO2: 96% (03 Jul 2017 11:00) (95% - 98%)      REVIEW OF SYSTEMS: limited     On Neurological Examination:    Mental Status - Patient is awake in bed       Does not follow commands    Speech -     Dysarthria                   Cranial Nerves - Pupils 3 mm equal and reactive to light,   extraocular eye movements intact.   smile symmetric  intact bilateral NLF    Motor Exam -   Right upper 4/5  Left upper 1/5 slight flexion/ext at elbow and hand   Right lower 3/5  Left lower 3/5    Muscle tone - is normal all over.  No asymmetry is seen.      Sensory    Bilateral intact to light touch    GENERAL Exam: Nontoxic , No Acute Distress   	  HEENT:  normocephalic, atraumatic  		  LUNGS:  Decreased bilaterally  	  HEART: Normal S1S2   No murmur RRR        	  GI/ ABDOMEN:  Soft  Non tender    EXTREMITIES:   No Edema  No Clubbing  No Cyanosis No Edema  	   SKIN: Normal  No Ecchymosis          LABS:  CBC Full  -  ( 02 Jul 2017 06:12 )  WBC Count : 11.1 K/uL  Hemoglobin : 11.1 g/dL  Hematocrit : 35.4 %  Platelet Count - Automated : 252 K/uL  Mean Cell Volume : 76.1 fl  Mean Cell Hemoglobin : 23.9 pg  Mean Cell Hemoglobin Concentration : 31.4 gm/dL  Auto Neutrophil # : x  Auto Lymphocyte # : x  Auto Monocyte # : x  Auto Eosinophil # : x  Auto Basophil # : x  Auto Neutrophil % : x  Auto Lymphocyte % : x  Auto Monocyte % : x  Auto Eosinophil % : x  Auto Basophil % : x      07-02    142  |  107  |  8   ----------------------------<  84  3.3<L>   |  28  |  0.47<L>    Ca    7.7<L>      02 Jul 2017 06:12    TPro  5.9<L>  /  Alb  2.6<L>  /  TBili  0.8  /  DBili  x   /  AST  21  /  ALT  17  /  AlkPhos  102  07-02    Hemoglobin A1C:     LIVER FUNCTIONS - ( 02 Jul 2017 06:12 )  Alb: 2.6 g/dL / Pro: 5.9 g/dL / ALK PHOS: 102 U/L / ALT: 17 U/L / AST: 21 U/L / GGT: x           Vitamin B12         RADIOLOGY    IMPRESSION:  Slurring of speech and left-sided weakness, consistent with right middle cerebral artery territory ischemia.     What could be calculated from NIH 10 stroke scale would be .    I would recommend telemetry evaluation to rule out underlying arrhythmia..   ct head -CVA-- no significant changes from previous CT head   aspirin   I will recommend cholesterol medications.  monitor oral intake   ok to control SBP   aspiration precautions   PT eval  overall no significant change in neurologic   neurologic wise stable     spoke to son mario   7/2/17    26 minutes spent on total encounter; more than 50% of the visit was spent counseling and/or coordinating care by the attending physician.

## 2017-07-03 NOTE — PROGRESS NOTE ADULT - SUBJECTIVE AND OBJECTIVE BOX
INTERVAL HPI/OVERNIGHT EVENTS:  HPI:  History obtained through the charts    88 yo male who presents to the ED with a co of slurred speech.  Pt does suffer from dementia but is normally alert to person and place.  He is a fall risk but is ambulatory with a walker and assistance.  Per report pt was in his normal state of health yesterday and even when went for a walk.  He went to sleep around 10:30 pm.  Aide reports that at 5 am she went to check on him because he was snoring loudly.  She noted his mouth open with the facial droop at that time but did not think much.  Normally at this time pt is awake and trying to get out of bed but he remained sleeping and she thought he was tired.  She checked again at 6 am and he was still sleeping.  At a little after 8 am she woke the pt up and noted that his sleep was slurred and that he was not moving his left arm and could not sit up.  She called the pt family who came to check on him and then EMS was called.  Family denies history of previous CVA.  Son dose report that in February pt fell off a chair and suffered a C2 fracture.  The cervical collar was recently removed after several months of treatment.  Aide reports that last Monday pt did strike his head against the door as he was trying to leave the house.  Family further reports that he had 5 teeth removed under local on Monday but had been recovering well. no n/v/d/f/c   no abd pain    MEDICATIONS  (STANDING):  enoxaparin Injectable 40 milliGRAM(s) SubCutaneous every 24 hours  sodium chloride 0.9%. 1000 milliLiter(s) (75 mL/Hr) IV Continuous <Continuous>  atorvastatin 20 milliGRAM(s) Oral at bedtime  aspirin  chewable 81 milliGRAM(s) Oral daily  carvedilol 3.125 milliGRAM(s) Oral every 12 hours  lisinopril 2.5 milliGRAM(s) Oral daily  potassium chloride    Tablet ER 10 milliEquivalent(s) Oral daily    MEDICATIONS  (PRN):  acetaminophen   Tablet 650 milliGRAM(s) Oral every 6 hours PRN For Temp greater than 38 C (100.4 F)  acetaminophen   Tablet. 650 milliGRAM(s) Oral every 6 hours PRN Mild Pain (1 - 3)  ondansetron Injectable 4 milliGRAM(s) IV Push every 6 hours PRN Nausea      Allergies    No Known Allergies    Intolerances          General:  No wt loss, fevers, chills, night sweats, fatigue,   Eyes:  Good vision, no reported pain  ENT:  No sore throat, pain, runny nose, dysphagia  CV:  No pain, palpitations, hypo/hypertension  Resp:  No dyspnea, cough, tachypnea, wheezing  GI:  No pain, No nausea, No vomiting, No diarrhea, No constipation, No weight loss, No fever, No pruritis, No rectal bleeding, No tarry stools, No dysphagia,  :  No pain, bleeding, incontinence, nocturia  Muscle:  No pain, weakness  Neuro:  No weakness, tingling, memory problems  Psych:  No fatigue, insomnia, mood problems, depression  Endocrine:  No polyuria, polydipsia, cold/heat intolerance  Heme:  No petechiae, ecchymosis, easy bruisability  Skin:  No rash, tattoos, scars, edema      PHYSICAL EXAM:   Vital Signs:  Vital Signs Last 24 Hrs  T(C): 36.8 (2017 15:12), Max: 37.2 (2017 19:51)  T(F): 98.3 (2017 15:12), Max: 98.9 (2017 19:51)  HR: 69 (2017 15:12) (62 - 76)  BP: 146/65 (2017 15:12) (132/84 - 168/81)  BP(mean): --  RR: 19 (2017 15:12) (18 - 20)  SpO2: 97% (2017 15:12) (95% - 98%)  Daily     Daily Weight in k.2 (2017 04:00)I&O's Summary    2017 07:01  -  2017 07:00  --------------------------------------------------------  IN: 2380 mL / OUT: 0 mL / NET: 2380 mL        GENERAL:  Appears stated age, well-groomed, well-nourished, no distress  HEENT:  NC/AT,  conjunctivae clear and pink, no thyromegaly, nodules, adenopathy, no JVD, sclera -anicteric  CHEST:  Full & symmetric excursion, no increased effort, breath sounds clear  HEART:  Regular rhythm, S1, S2, no murmur/rub/S3/S4, no abdominal bruit, no edema  ABDOMEN:  Soft, non-tender, non-distended, normoactive bowel sounds,  no masses ,no hepato-splenomegaly, no signs of chronic liver disease  EXTEREMITIES:  no cyanosis,clubbing or edema  SKIN:  No rash/erythema/ecchymoses/petechiae/wounds/abscess/warm/dry  NEURO:  Alert, oriented, no asterixis, no tremor, no encephalopathy      LABS:                        11.1   11.1  )-----------( 252      ( 2017 06:12 )             35.4     07-02    142  |  107  |  8   ----------------------------<  84  3.3<L>   |  28  |  0.47<L>    Ca    7.7<L>      2017 06:12    TPro  5.9<L>  /  Alb  2.6<L>  /  TBili  0.8  /  DBili  x   /  AST  21  /  ALT  17  /  AlkPhos  102  07-02        amylase   lipase  RADIOLOGY & ADDITIONAL TESTS:

## 2017-07-03 NOTE — PROGRESS NOTE ADULT - PROBLEM SELECTOR PLAN 1
aspiration precautions  goc conversaton  ivf  check elcetrolytes
aspiration precautions  goc conversaton  ivf  check elcetrolytes
npo  goc conversaton  ivf  check elcetrolytes
npo  goc conversaton  ivf  check elcetrolytes
on pleasure feeds, aspiration precautions  goc conversaton  ivf  check elcetrolytes
npo  goc conversaton  ivf  check elcetrolytes

## 2017-07-06 DIAGNOSIS — Z95.1 PRESENCE OF AORTOCORONARY BYPASS GRAFT: ICD-10-CM

## 2017-07-06 DIAGNOSIS — Z66 DO NOT RESUSCITATE: ICD-10-CM

## 2017-07-06 DIAGNOSIS — I25.10 ATHEROSCLEROTIC HEART DISEASE OF NATIVE CORONARY ARTERY WITHOUT ANGINA PECTORIS: ICD-10-CM

## 2017-07-06 DIAGNOSIS — I63.9 CEREBRAL INFARCTION, UNSPECIFIED: ICD-10-CM

## 2017-07-06 DIAGNOSIS — F03.90 UNSPECIFIED DEMENTIA, UNSPECIFIED SEVERITY, WITHOUT BEHAVIORAL DISTURBANCE, PSYCHOTIC DISTURBANCE, MOOD DISTURBANCE, AND ANXIETY: ICD-10-CM

## 2017-07-06 DIAGNOSIS — S12.101S: ICD-10-CM

## 2017-07-06 DIAGNOSIS — R47.81 SLURRED SPEECH: ICD-10-CM

## 2017-07-06 DIAGNOSIS — I69.991 DYSPHAGIA FOLLOWING UNSPECIFIED CEREBROVASCULAR DISEASE: ICD-10-CM

## 2017-07-06 DIAGNOSIS — R26.81 UNSTEADINESS ON FEET: ICD-10-CM

## 2017-07-06 DIAGNOSIS — R13.10 DYSPHAGIA, UNSPECIFIED: ICD-10-CM

## 2017-07-12 DIAGNOSIS — R29.710 NIHSS SCORE 10: ICD-10-CM

## 2017-07-12 DIAGNOSIS — Z79.82 LONG TERM (CURRENT) USE OF ASPIRIN: ICD-10-CM

## 2017-07-12 DIAGNOSIS — I63.511 CEREBRAL INFARCTION DUE TO UNSPECIFIED OCCLUSION OR STENOSIS OF RIGHT MIDDLE CEREBRAL ARTERY: ICD-10-CM

## 2017-07-12 DIAGNOSIS — G81.94 HEMIPLEGIA, UNSPECIFIED AFFECTING LEFT NONDOMINANT SIDE: ICD-10-CM

## 2022-09-23 NOTE — DISCHARGE NOTE ADULT - HOSPITAL COURSE
23-Sep-2022 13:03
88 yo male who presents to the ED with a co of slurred speech.  Pt does suffer from dementia but is normally alert to person and place.  He is a fall risk but is ambulatory with a walker and assistance.  Per report pt was in his normal state of health yesterday and even when went for a walk.  He went to sleep around 10:30 pm.  Aide reports that at 5 am she went to check on him because he was snoring loudly.  She noted his mouth open with the facial droop at that time but did not think much.  Normally at this time pt is awake and trying to get out of bed but he remained sleeping and she thought he was tired.  She checked again at 6 am and he was still sleeping.  At a little after 8 am she woke the pt up and noted that his sleep was slurred and that he was not moving his left arm and could not sit up.  She called the pt family who came to check on him and then EMS was called.  Family denies history of previous CVA.  Son dose report that in February pt fell off a chair and suffered a C2 fracture.  The cervical collar was recently removed after several months of treatment.  Aide reports that last Monday pt did strike his head against the door as he was trying to leave the house.  Family further reports that he had 5 teeth removed under local on Monday but had been recovering well.   pt had a subacute stroke, dc planning to rehab    please have dental see pt ASAP at rehab for suture removal

## 2023-02-27 NOTE — PATIENT PROFILE ADULT. - NS PRO PT REFERRAL QUES 2 YN
Received request via: Pharmacy    Was the patient seen in the last year in this department? Yes    Does the patient have an active prescription (recently filled or refills available) for medication(s) requested? No    Does the patient have assisted Plus and need 100 day supply (blood pressure, diabetes and cholesterol meds only)? Patient does not have SCP  
no

## 2023-11-22 NOTE — DISCHARGE NOTE ADULT - NS AS DC AFIB YN RESTRICT
OB Hx:  1 sAB, no d&c    Gyn Hx:  H/o PCOS, previously treated with OCPs.  Denies h/o fibroids, STIs, or abnormal pap smears no,

## 2024-07-03 NOTE — ED PROVIDER NOTE - MUSCULOSKELETAL, MLM
Insurance requesting 90 day supply of Azelastine spray   Spine is tender to palp of the neck. he has no motor weakness of the upper extremities or lower extremities (pt has core weakness at baseline per son), range of motion is not limited, no muscle or joint tenderness

## 2025-06-23 NOTE — PATIENT PROFILE ADULT. - NS PRO PT REFERRAL QUES 2 YN
No protocol for requested medication     Medication: zofran  Last office visit date: 6/2/25  Pharmacy: [facility]    Order pended, routed to clinician for review.     Upcoming appointment scheduled on: 7/11/2025     Last refill on: 6/2/25       no